# Patient Record
(demographics unavailable — no encounter records)

---

## 2018-02-02 NOTE — RAD
Exam performed: CT angiogram chest.

 

HISTORY: COPD, emphysema, shortness of breath and elevated d-dimer.

 

DATE OF SERVICE: 2/2/2018.

 

COMPARISON: None available

 

TECHNIQUE: Contiguous helical acquisitions are obtained through the chest 

during intravenous administration of 75 cc of Omnipaque 300. Sagittal and 

coronal MIP images are obtained and reviewed.

 

FINDINGS:

 

Adequate opacification of the pulmonary arteries. No filling defects to 

suggest pulmonary embolism is seen. Mild ectasia of the ascending aorta 

which measures up to 3.8 cm maximum AP dimension at the level of right 

main pulmonary artery. The central airway is patent without endoluminal 

lesions. No neck or axillary  is seen. Mildly prominent mediastinal and 

right hilar lymph nodes are identified.

 

Bilateral emphysematous changes with more severe biapical emphysematous 

changes. Patchy infiltrates seen in both lung bases and right middle lobe,

greatest in the right lung base. There are changes of mild bronchiectasis 

in both lower lobes. There is no pleural effusion or pneumothorax.

 

Limited evaluation of the upper abdominal structures is unremarkable. 

There is perhaps a nonobstructing calculus in the left kidney.

 

IMPRESSION:

 

1.  Diffuse bilateral emphysematous changes with scattered infiltrates in 

both lower lobes and right middle lobe.

2.  No evidence of pulmonary embolism seen.

3.  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 

cm at the level of right main pulmonary artery.

4.  Nonobstructing left renal calculus.

 

 

 

 

PQRS Compliance Statement:

 

One or more of the following individualized dose reduction techniques were

utilized for this examination:  

1. Automated exposure control  

2. Adjustment of the mA and/or kV according to patient size  

3. Use of iterative reconstruction technique

 

 

Electronically signed by: Mayte Villarreal MD (2/2/2018 10:19 PM) [facility]

## 2018-02-02 NOTE — ED.ADGEN
Past History


Past Medical History:  CAD, CHF, COPD


Past Surgical History:  Other


Smoking:  Cigarettes





Adult General


Chief Complaint


Chief Complaint


".. [facility].... [facility] ....[facility].... [facility] 

my home.... oxygen set up.. and my tank ....ran out... "





[facility]


HPI





Patient is a 64 year old male who presents with respiratory failure.  Pt. 

presents with hypoxia after [facility]. 

Paramedic advised on arrival  in severe respiratory distress .  The pt. sats  

were too low to read and even after treatments his sats. were only 70%,  Pt. hx 

95 pack year smoking hx, has been so short of breath today he could not smoke.  

Pt. portable tank ran out today.  Pt. hx of 3 liter  oxygen dependent.   Pt.  

sent to [place] [facility].   Pt. has as yet not been intubated 

for a COPD exacerbation.  Pt. denies any travel, any specific ill contacts, 

other than other hospitalized patients.  Patient in obvious respiratory 

distress and tachycardic.  Pt. only able to speak in 2 to 3  words groups due 

to his severe dyspnea. Patient has been complaining of chills, fever, rhinorrhea

, pharyngitis, nausea,myalgia, arthralgia, and malaise.  Patient reports his 

symptoms seem worse the last couple days.





Review of Systems


Review of Systems





Constitutional: Subjective history fever or chills []


Eyes: Denies change in visual acuity, redness, or eye pain []


HENT: History nasal congestion and sore throat []


Respiratory: History cough and shortness of breath []


Cardiovascular: No additional information not addressed in HPI []


GI: Denies abdominal pain, nausea, vomiting, bloody stools or diarrhea []


: Denies dysuria or hematuria []


Musculoskeletal: Complains of generalized muscle and or joint pain []


Integument: Denies rash or skin lesions []


Neurologic: Denies headache, focal weakness or sensory changes []


Endocrine: Denies polyuria or polydipsia []





All other systems were reviewed and found to be within normal limits, except as 

documented in this note.





Family History


Family History


Noncontributory





Current Medications


Current Medications





Current Medications








 Medications


  (Trade)  Dose


 Ordered  Sig/John  Start Time


 Stop Time Status Last Admin


Dose Admin


 


 Albuterol/


 Ipratropium


  (Duoneb)  3 ml  RTQID  2/3/18 08:00


 2/4/18 07:59   


 


 


 Aspirin


  (Children'S


 Aspirin)  81 mg  DAILY  2/3/18 09:00


     


 


 


 Azithromycin


  (Zithromax)  250 mg  DAILY  2/3/18 09:00


     


 


 


 Ceftriaxone


 Sodium 1 gm/


 Sodium Chloride  50 ml @ 


 100 mls/hr  DAILY  2/3/18 09:00


   UNV  


 


 


 Ceftriaxone Sodium


  (Rocephin)  1 gm  1X  ONCE  2/2/18 19:45


 2/2/18 19:46 DC 2/2/18 19:42


1 GM


 


 Enoxaparin Sodium


  (Lovenox 80mg


 Syringe)  70 mg  Q12HR  2/3/18 09:00


     


 


 


 Enoxaparin Sodium


  (Lovenox)  75 mg  BID  2/2/18 21:00


 2/2/18 21:09 DC  


 


 


 Info


  (Do NOT chart on


 this entry -- for


 MONITORING)  1 each  PRN DAILY  PRN  2/2/18 20:45


 2/4/18 20:44   


 


 


 Iohexol


  (Omnipaque 300


 Mg/ml)  75 ml  1X  ONCE  2/2/18 21:00


 2/2/18 21:01 DC 2/2/18 21:19


75 ML


 


 Lactobacillus


 Rhamnosus


  (Culturelle)  1 cap  BID  2/3/18 09:00


     


 


 


 Methylprednisolone


 Sodium Succinate


  (SOLU-Medrol


 125MG VIAL)  125 mg  1X  ONCE  2/2/18 19:45


 2/2/18 19:46 DC 2/2/18 19:38


125 MG


 


 Morphine Sulfate


  (Morphine 10mg


 Syringe)  10 mg  PRN QID  PRN  2/2/18 21:00


     


 


 


 Ondansetron HCl


  (Zofran)  4 mg  PRN Q4HRS  PRN  2/2/18 20:30


 2/3/18 20:29   


 


 


 Oseltamivir


 Phosphate


  (Tamiflu)  75 mg  BID  2/2/18 21:00


 2/7/18 20:59   


 


 


 Sodium Chloride  1,000 ml @ 


 100 mls/hr  Q10H  2/2/18 19:00


 2/3/18 04:59  2/2/18 19:42


100 MLS/HR





See nursing for home meds





Allergies


Allergies





Allergies








Coded Allergies Type Severity Reaction Last Updated Verified


 


  No Known Drug Allergies    2/2/18 No





No known drug allergies





Physical Exam


Physical Exam





Constitutional: in acute distress, non-toxic appearance. []


HENT: Normocephalic, atraumatic, bilateral external ears normal, oropharynx 

moist, mild injection of pharynx, no oral exudates, nose rhinorrhea.  

Edentulous.


Eyes: PERRLA, EOMI, conjunctiva normal, no discharge. [] 


Neck: Normal range of motion, no tenderness, supple, no stridor. [] 


Cardiovascular: Tachycardia Heart rate regular rhythm, no murmur []


Lungs & Thorax:  Bilateral breath sounds equal apex with scattered wheezes 

throughout auscultation, [retractions, minimal air movement


Abdomen: Bowel sounds normal, soft, no tenderness, no masses, no pulsatile 

masses. [] 


Skin: Warm, dry, no erythema, no rash. [] 


Back: Chronic low back pain, but complains of generalized arthralgia, myalgia 

and malaise. no CVA tenderness. Old surgical scars


Extremities: No tenderness, no cyanosis, no clubbing, ROM intact, no edema. 

Muscle wasting


Neurologic: Alert and oriented X 3, normal motor function, normal sensory 

function, no focal deficits noted. []


Psychologic: Affect anxious, judgement normal, mood normal. []





Current Patient Data


Vital Signs





 Vital Signs








  Date Time  Temp Pulse Resp B/P (MAP) Pulse Ox O2 Delivery O2 Flow Rate FiO2


 


2/2/18 19:20     97 Nasal Cannula 3.0 


 


2/2/18 18:50 98.1 116 20     








Lab Results





 Laboratory Tests








Test


  2/2/18


18:50 2/2/18


19:00 2/2/18


19:20


 


White Blood Count


  10.0 x10^3/uL


(4.0-11.0) 


  


 


 


Red Blood Count


  5.34 x10^6/uL


(4.30-5.70) 


  


 


 


Hemoglobin


  17.2 g/dL


(13.0-17.5) 


  


 


 


Hematocrit


  50.2 %


(39.0-53.0) 


  


 


 


Mean Corpuscular Volume


  94 fL ()


  


  


 


 


Mean Corpuscular Hemoglobin 32 pg (25-35)    


 


Mean Corpuscular Hemoglobin


Concent 34 g/dL


(31-37) 


  


 


 


Red Cell Distribution Width


  12.8 %


(11.5-14.5) 


  


 


 


Platelet Count


  199 x10^3/uL


(140-400) 


  


 


 


Neutrophils (%) (Auto) 92 % (31-73)  H  


 


Lymphocytes (%) (Auto) 3 % (24-48)  L  


 


Monocytes (%) (Auto) 4 % (0-9)    


 


Eosinophils (%) (Auto) 0 % (0-3)    


 


Basophils (%) (Auto) 0 % (0-3)    


 


Neutrophils # (Auto)


  9.2 x10^3uL


(1.8-7.7)  H 


  


 


 


Lymphocytes # (Auto)


  0.3 x10^3/uL


(1.0-4.8)  L 


  


 


 


Monocytes # (Auto)


  0.4 x10^3/uL


(0.0-1.1) 


  


 


 


Eosinophils # (Auto)


  0.0 x10^3/uL


(0.0-0.7) 


  


 


 


Basophils # (Auto)


  0.0 x10^3/uL


(0.0-0.2) 


  


 


 


Prothrombin Time


  11.8 SEC


(9.4-11.4)  H 


  


 


 


Prothrombin Time INR


  1.2 (0.9-1.1)


H 


  


 


 


PTT


  27 SEC (23-33)


  


  


 


 


D-Dimer ([place])


  1.07 mg/L


(0.00-0.50)  H 


  


 


 


Sodium Level


  138 mmol/L


(136-145) 


  


 


 


Potassium Level


  4.9 mmol/L


(3.5-5.1) 


  


 


 


Chloride Level


  98 mmol/L


() 


  


 


 


Carbon Dioxide Level


  32 mmol/L


(21-32) 


  


 


 


Anion Gap 8 (6-14)    


 


Blood Urea Nitrogen


  25 mg/dL


(8-26) 


  


 


 


Creatinine


  0.9 mg/dL


(0.7-1.3) 


  


 


 


Estimated GFR


(Cockcroft-Gault) 85.0  


  


  


 


 


Glucose Level


  155 mg/dL


(70-99)  H 


  


 


 


Calcium Level


  8.9 mg/dL


(8.5-10.1) 


  


 


 


Magnesium Level


  2.3 mg/dL


(1.8-2.4) 


  


 


 


Total Bilirubin


  0.4 mg/dL


(0.2-1.0) 


  


 


 


Direct Bilirubin


  0.1 mg/dL


(0.0-0.2) 


  


 


 


Aspartate Amino Transferase


(AST) 39 U/L (15-37)


H 


  


 


 


Alanine Aminotransferase (ALT)


  36 U/L (16-63)


  


  


 


 


Alkaline Phosphatase


  85 U/L


() 


  


 


 


Creatine Kinase


  82 U/L


() 


  


 


 


Creatine Kinase MB (Mass)


  3.7 ng/mL


(0.0-3.6)  H 


  


 


 


Creatine Kinase MB Relative


Index 4.5 % (0-4)  H


  


  


 


 


Troponin I Quantitative


  < 0.017 ng/mL


(0-0.055) 


  


 


 


NT-Pro-B-Type Natriuretic


Peptide 288 pg/mL


(0-124)  H 


  


 


 


Total Protein


  7.5 g/dL


(6.4-8.2) 


  


 


 


Albumin


  3.3 g/dL


(3.4-5.0)  L 


  


 


 


Lipase


  67 U/L


()  L 


  


 


 


Blood pH


  


  7.35


(7.35-7.46) 


 


 


Blood Gas PCO2


  


  57 mmHg


(35-46)  H 


 


 


Blood Gas PO2


  


  100 mmHg


() 


 


 


Blood Gas HCO3


  


  31 mmol/L


(21-28)  H 


 


 


Arterial Bld O2 Saturation


(Calc) 


  97 % (92-99)  


  


 


 


FiO2  32 %   


 


Influenza Type A (Rapid)


  


  


  Negative


(NEGATIVE)


 


Influenza Type B (Rapid)


  


  


  Positive


(NEGATIVE)











EKG


EKG


My interpretation EKG shows a sinus tachycardia at 107 bpm with bi multiple P 

waves, left axis and anterior lateral strain pattern.[]





Radiology/Procedures


Radiology/Procedures


My interpretation chest x-ray shows chronic scarring, flattening of diaphragm 

and hyperexpanded with emphysema/COPD changes.[]


CT of chest pending at time of admission





Course & Med Decision Making


Course & Med Decision Making


Pertinent Labs and Imaging studies reviewed. (See chart for details)





Discussed presentation, testing and tx. plan with Dr. Quiñonez for further 

evaluation and treatment





[]





Final Impression


Final Impression


1. Respiratory Failure


2. COPD exacerbation


3. Hypoxia[]


4. Influenza B


5. Elevated d-dimer


6. Elevated glucose


7. Viral syndrome


Problems:  





Dragon Disclaimer


Dragon Disclaimer


This electronic medical record was generated, in whole or in part, using a 

voice recognition dictation system.











MARK SUÁREZ MD Feb 2, 2018 18:59

## 2018-02-02 NOTE — EKG
[facility] [address], [place] [address]

Test Date:    2018               Test Time:    18:56:16

Pat Name:     SRAVANI BROWN                Department:   

Patient ID:   [id number]           Room:          

Gender:       M                        Technician:   SRAVANI BROWN

:          1953               Requested By: MARK SUÁREZ

Order Number: [id number]            Reading MD:     

                                 Measurements

Intervals                              Axis          

Rate:         107                      P:            -21

DC:           118                      QRS:          -23

QRSD:         88                       T:            -16

QT:           320                                    

QTc:          432                                    

                           Interpretive Statements

SINUS TACHYCARDIA

LEFT ATRIAL ABNORMALITY

LEFTWARD AXIS

R-S TRANSITION ZONE IN V LEADS DISPLACED TO THE LEFT

QRS(T) CONTOUR ABNORMALITY

CONSIDER ANTEROLATERAL MYOCARDIAL DAMAGE

T ABNORMALITY IN INFERIOR LEADS

ABNORMAL ECG

RI6.01

No previous ECG available for comparison

## 2018-02-03 NOTE — RAD
Indication: Hypoxemia.  History of COPD.



Technique: Portable AP upright chest x-ray



Comparison: None



Findings:

Heart is normal in size.  Lungs are hyperinflated without focal consolidation.

 No pneumothorax or pleural effusion.  Visualized bony thorax within normal

limits.



Impression:

No acute cardiopulmonary process.  Findings of COPD.

## 2018-02-03 NOTE — HP
ADMIT DATE:  2018



HISTORY OF PRESENT ILLNESS:  The patient is a 64-year-old  male

patient, a , who came to the Emergency Room, who was discharged from a

[facility] yesterday.  He was admitted there with

COPD exacerbation and was discharged home with oxygen.  Unfortunately, his

oxygen tank was empty and the paramedic on arrival found him to be in severe

respiratory distress and his oxygen saturation was extremely low and even after

treatment, his oxygen saturation was only 70%.  The patient is a heavy smoker,

has been a smoker for almost 45 years and has a 95 pack year smoking history and

apparently has been so short of breath that he could not smoke.  His oxygen

portable tank ran out yesterday.  He has been on 3 L of oxygen continuously.  He

[facility]; however, he has never been

intubated for COPD exacerbation.  He denied any travel, any specific ill contact

other than being hospitalized patient and by the time he arrived here, he was in

obvious respiratory distress, tachypneic, was barely able to walk, to speak even

2-3 words due to severe dyspnea, has been complaining of chills, fever,

rhinorrhea, pharyngitis, nausea, myalgia, arthralgia and malaise and he reported

that his symptoms seem to be worse the last couple of days.  He was evaluated in

the Emergency Room, was found to be positive for influenza B.  His D-dimer was

high and he underwent CT angio, which basically showed no evidence of pulmonary

embolism; however, he has diffuse bilateral emphysematous changes with scattered

infiltrates in both lower lobes and right middle lobe and was admitted with COPD

exacerbation, acute hypoxic hypercapnic respiratory failure, pneumonia, and ____

influenza B.



PAST MEDICAL HISTORY:  Significant for COPD.  He has also bilateral cataract,

underwent cataract extraction in 1 eye and is scheduled to have it done to the

other eye.



PAST SURGICAL HISTORY:  Significant for multiple prostate biopsies with

questionable prostate cancer, nasal surgery, back surgery.



ALLERGIES:  The patient has no known drug allergies.



MEDICATIONS:  Unfortunately, we do not know his home medications.



FAMILY HISTORY:  He has 2 brothers, 1 killed himself, one still alive and

healthy, one sister is alive, but does not know her.  Both parents were

.



SOCIAL HISTORY:  He is , has no children.  He is a heavy smoker, smokes

2-1/2 packs a day for almost 45 years.  Does not drink alcohol or use any

recreational drugs.  He is retired from the [employer].



REVIEW OF SYSTEMS:  The patient has bilateral cataracts and status post cataract

extraction in 1 eye.  Denied any glaucoma or macular degeneration.  Denied any

earache, tinnitus or sensorineural deafness.  Denied any nosebleeds, stuffy nose

or postnasal drip.  Denied any sore throat, sore tongue, toothache, hoarseness

of voice or difficulty swallowing.  He did say that he lost about 65 pounds in

the last 4 years unintentionally.  Denied any nausea or vomiting, but did have

multiple episodes of diarrhea over the last 4 days.  Denied any hematemesis,

melena or hematochezia.  Denied any dysuria, frequency, or hematuria, did

complain of nocturia.  He states that he wakes up about up to 10 times at

nighttime to go to the bathroom.  He denied any chest pain.  Did complain of

shortness of breath, cough, which is mostly dry.  Denied any orthopnea or

paroxysmal nocturnal dyspnea.  Did complain of chills, rigors and fever.  Denied

any dizziness, lightheadedness, or vertigo.



PHYSICAL EXAMINATION:

GENERAL:  On arrival to the Emergency Room, he was somewhat cachectic.  No

pallor or jaundice.  No lymphadenopathy, no thyromegaly.  No jugular venous

distention.  No limb edema.

VITAL SIGNS:  His heart rate was 116, blood pressure was 133/81, temperature was

98.1, respiratory rate was 20, and oxygen saturation was 98% on 3 L of oxygen by

nasal cannula.

HEAD, EYES, EARS, NOSE AND THROAT:  Showed normocephalic, atraumatic.

NECK:  Supple.

HEART:  Showed normal first and second heart sounds with no gallop, rub or

murmur.

CHEST:  Clear to auscultation.  No crepitation or rhonchi.

ABDOMEN:  Distended, soft, nontender.  No guarding or rigidity.  No

organomegaly.  Hernial orifice intact.  Bowel sounds normal.

NEUROLOGIC:  He was awake, alert, responding appropriately.  Cranial nerves

intact.

EXTREMITIES:  He moves extremities without difficulty, ambulates without

assistance or assistive devices.



LABORATORY DATA:  His white cell count was 10,000, hemoglobin 17, hematocrit 50,

MCV 94 and platelet count of 199,000.  His chemistry showed a serum sodium 138,

potassium 4.9, chloride 98, bicarbonate 32, anion gap of 8, BUN 25, creatinine

0.9, estimated GFR was 85 mL per minute, his glucose 155, calcium was 8.9,

magnesium was 2.3.  Total bilirubin, AST, ALT, and alkaline phosphatase were

normal.  His total protein was 7.5, albumin was 3.3.  Lipase was 67.  TSH was

0.329.  His arterial blood gases showed a pH of 7.35, pCO2 of 57, pO2 of 100. 

His bicarbonate was 31 and oxygen saturation was 97% on FiO2 of 32%.  His

prothrombin time was 11.8, INR 1.2, APTT was 27.  D-dimer is 1.07.  Urinalysis

was essentially unremarkable.  There was large amount of blood and 11-20 rbc's,

there were 1-4 wbc's, very few bacteria.  His toxic screen was positive for

opiates and benzodiazepine.  His serology was positive for influenza B.  His

chest x-ray showed the heart is normal in size.  Lungs are hyperinflated without

local consolidation, no pneumothorax, no pleural effusion, visualized bony

thorax within normal limits.  His CT angio showed that the patient has diffuse

bilateral emphysematous changes with scattered infiltrates in both lower lobes

and right middle lobe.  No evidence of pulmonary embolism seen.  He has mild

aneurysmal dilatation of the ascending aorta measuring up to 3.8 cm at the level

of right main pulmonary artery, nonobstructing left renal calculus.



IMPRESSION:  In summary, this is a 64-year-old  male patient who was

admitted with an acute hypoxic hypercapnic respiratory failure, chronic

obstructive pulmonary disease exacerbation, influenza B, and bilateral lung

infiltrate.  He has also enlarged and benign prostatic hypertrophy versus

prostate cancer with severe nocturia.  My plan is to discontinue the Lovenox and

start him only on prophylactic dose and add steroids.  Continue with IV

antibiotic and nebulized treatment as well as Tamiflu.  We will scan his bladder

to see if he is retaining any urine as he might require catheterization.





______________________________

YOLANDA JESUS MD



DR:  CEM/malorie  JOB#:  [id number] / [id number]

DD:  2018 15:08  DT:  2018 19:43

## 2018-02-05 NOTE — PN
DATE:  



SUBJECTIVE:  The patient is resting, slightly propped up in bed, in no apparent

respiratory distress.  He is definitely much improved than yesterday.



PHYSICAL EXAMINATION:

GENERAL:  When I examined him, he looked well.  No pallor, jaundice, cyanosis,

or thyromegaly.  No jugular venous distension.  No lower limb edema.

VITAL SIGNS:  His heart rate was 66, blood pressure 114/75, temperature was

97.6, respiratory rate was 20, and oxygen saturation was 98% on 2 liters of

oxygen.

HEAD, EYES, EARS, NOSE, AND THROAT:  Normocephalic, atraumatic.

NECK:  Supple.

HEART:  Showed normal first and second heart sounds with no gallop, rub, or

murmur.

CHEST:  Showed central trachea, equally reduced expansion, reduced air entry,

vesicular sounds.  I could not really appreciate any crepitation or rhonchi.

ABDOMEN:  Distended, soft, nontender.

NEUROLOGIC:  He is awake, alert, responding appropriately.  Cranial nerves

intact.  He moves all extremities without difficulty.



His intake over the last 24 hours was 1680, output was 850.



LABORATORY DATA:  His lab work this morning showed a white cell count of 10,300,

hemoglobin 14, hematocrit 42, MCV 95 and platelet count of 178,000.  His blood

gases showed a pH of 7.37, pCO2 of 65, pO2 of 114, bicarb 37, and oxygen

saturation was 98% on FiO2 of 36%.  His blood cultures are so far negative.



ASSESSMENT:  This is a 64-year-old  male patient, who was admitted

with:

1.  Acute hypoxic hypercapnic respiratory failure.

2.  Chronic obstructive pulmonary disease exacerbation.

3.  Influenza B.

4.  Bilateral lung infiltrate, likely healthcare-associated pneumonia.

5.  Benign prostatic hypertrophy.



PLAN:  To continue with the Lovenox for DVT prophylaxis.  Continue with

steroids, bronchodilators, and antibiotics as well as Tamiflu.





______________________________

YOLANDA JESUS MD



DR:  CEM/malorie  JOB#:  [id number] / [id number]

DD:  02/04/2018 14:27  DT:  02/05/2018 08:48

## 2018-02-05 NOTE — PN
DATE:  02/03/2018



SUBJECTIVE:  The patient is a 64-year-old  who was admitted yesterday

with COPD exacerbation and acute hypoxic hypercapnic respiratory failure,

bilateral lung infiltrate and influenza B.  He also has chronic back pain.  He

continued to complain of shortness of breath and back pain.  He also has pain in

his suprapubic area and continued to have nocturia.



PHYSICAL EXAMINATION:

GENERAL:  When I saw him this afternoon, he was resting slightly propped up in

bed, slightly tachypneic, but there is no pallor, jaundice, cyanosis, or

thyromegaly.  No jugular venous distension.  No lower limb edema.

VITAL SIGNS:  His heart rate was 79, blood pressure was 137/85, temperature was

98.4, respiratory rate was 22 and oxygen saturation was 98% on 3 liters of

oxygen.

HEAD, EYES, EARS, NOSE AND THROAT:  Showed normocephalic, atraumatic.

NECK:  Supple.

HEART:  Showed normal first and second heart sounds with no gallop, rub or

murmur.

CHEST:  Showed central trachea, equally reduced expansion, reduced air entry,

vesicular sounds.  I really could not appreciate any crepitation or rhonchi.

ABDOMEN:  Scaphoid, soft, nontender.

NEUROLOGIC:  He is awake, alert, responding appropriately.  All cranial nerves

are intact.  He moves extremities without difficulty.  He ambulates without

assistance or assistive devices.



ASSESSMENT:

1.  Acute hypoxic hypercapnic respiratory failure.

2.  Chronic obstructive pulmonary disease exacerbation.

3.  Bilateral lung infiltrate.

4.  Influenza B.

5.  Chronic back pain.

6.  Benign prostatic hypertrophy versus prostate cancer.



PLAN:  My plan is to put him on Solu-Medrol 125 mg once a day and 60 mg every 8

hours.  Add Singulair and Pulmicort.  Discontinue subcutaneous morphine.  Start

him on oxycodone 5 mg every 4 hours.  We will scan his bladder to make sure that

he has no urinary retention.  Start him also on Flomax 0.4 mg. I will check and

repeat all his labs including PSA tomorrow.





______________________________

YOLANDA JESUS MD



DR:  CEM/malorie  JOB#:  [id number] / [id number]

DD:  02/03/2018 15:18  DT:  02/05/2018 00:27

## 2018-02-05 NOTE — PDOC3
Discharge Summary


Visit Information


Date of Admission:  Feb 2, 2018


Date of Discharge:  Feb 5, 2018


Final Diagnosis


Problems


Medical Problems:


(1) COPD exacerbation


Status: Acute  








1.  Acute hypoxic hypercapnic respiratory failure.


2.  Chronic obstructive pulmonary disease exacerbation.


3.  Influenza B.


4.  Bilateral lung infiltrate, likely healthcare-associated pneumonia.


5.  Benign prostatic hypertrophy.


6. Tobacco use disorder


7. 3.8 cm aortic aneurysm


8. emphesema on ct


9. mildly suppressed TSH


10. DVT prophylaxis-on Lovonox


Problems:  





Brief Hospital Course


Allergies





 Allergies








Coded Allergies Type Severity Reaction Last Updated Verified


 


  No Known Drug Allergies    2/2/18 No








Vital Signs


PE Mildly SOB, lungs with diffuse wheezes, cvrrr, ab soft non tender, ext 

without edema.


Vital Signs








  Date Time  Temp Pulse Resp B/P (MAP) Pulse Ox O2 Delivery O2 Flow Rate FiO2


 


2/5/18 15:47 97.7 75  141/79 (99) 99  2.0 


 


2/5/18 14:44   18     


 


2/5/18 10:53      Nasal Cannula  








Lab Results





Laboratory Tests








Test


  2/3/18


16:58 2/4/18


06:48 2/5/18


05:50


 


Blood Gas pH


  7.37


(7.35-7.46) 


  


 


 


Blood Gas PCO2


  65 mmHg


(35-46) 


  


 


 


Blood Gas PO2


  114 mmHg


() 


  


 


 


Blood Gas HCO3


  37 mmol/L


(21-28) 


  


 


 


Arterial Bld O2 Saturation


(Calc) 98 % (92-99) 


  


  


 


 


FiO2 36 %   


 


White Blood Count


  


  10.3 x10^3/uL


(4.0-11.0) 10.1 x10^3/uL


(4.0-11.0)


 


Red Blood Count


  


  4.51 x10^6/uL


(4.30-5.70) 4.22 x10^6/uL


(4.30-5.70)


 


Hemoglobin


  


  14.3 g/dL


(13.0-17.5) 13.5 g/dL


(13.0-17.5)


 


Hematocrit


  


  42.7 %


(39.0-53.0) 39.8 %


(39.0-53.0)


 


Mean Corpuscular Volume  95 fL ()  94 fL () 


 


Mean Corpuscular Hemoglobin  32 pg (25-35)  32 pg (25-35) 


 


Mean Corpuscular Hemoglobin


Concent 


  34 g/dL


(31-37) 34 g/dL


(31-37)


 


Red Cell Distribution Width


  


  12.9 %


(11.5-14.5) 12.7 %


(11.5-14.5)


 


Platelet Count


  


  178 x10^3/uL


(140-400) 196 x10^3/uL


(140-400)


 


Sodium Level


  


  141 mmol/L


(136-145) 141 mmol/L


(136-145)


 


Potassium Level


  


  4.1 mmol/L


(3.5-5.1) 4.3 mmol/L


(3.5-5.1)


 


Chloride Level


  


  100 mmol/L


() 102 mmol/L


()


 


Carbon Dioxide Level


  


  38 mmol/L


(21-32) 40 mmol/L


(21-32)


 


Anion Gap  3 (6-14)  -1 (6-14) 


 


Blood Urea Nitrogen


  


  14 mg/dL


(8-26) 14 mg/dL


(8-26)


 


Creatinine


  


  0.8 mg/dL


(0.7-1.3) 0.7 mg/dL


(0.7-1.3)


 


Estimated GFR


(Cockcroft-Gault) 


  97.3 


  113.5 


 


 


BUN/Creatinine Ratio  18 (6-20)  20 (6-20) 


 


Glucose Level


  


  100 mg/dL


(70-99) 109 mg/dL


(70-99)


 


Calcium Level


  


  8.7 mg/dL


(8.5-10.1) 8.1 mg/dL


(8.5-10.1)


 


Total Bilirubin


  


  0.2 mg/dL


(0.2-1.0) 0.2 mg/dL


(0.2-1.0)


 


Aspartate Amino Transf


(AST/SGOT) 


  19 U/L (15-37) 


  17 U/L (15-37) 


 


 


Alanine Aminotransferase


(ALT/SGPT) 


  27 U/L (16-63) 


  30 U/L (16-63) 


 


 


Alkaline Phosphatase


  


  64 U/L


() 54 U/L


()


 


Total Protein


  


  6.7 g/dL


(6.4-8.2) 6.1 g/dL


(6.4-8.2)


 


Albumin


  


  2.7 g/dL


(3.4-5.0) 2.4 g/dL


(3.4-5.0)


 


Albumin/Globulin Ratio  0.7 (1.0-1.7)  0.6 (1.0-1.7) 


 


Prostate Specific Antigen


  


  2.55 ng/ml


(0.00-4.00) 


 


 


Neutrophils (%) (Auto)   88 % (31-73) 


 


Lymphocytes (%) (Auto)   5 % (24-48) 


 


Monocytes (%) (Auto)   7 % (0-9) 


 


Eosinophils (%) (Auto)   0 % (0-3) 


 


Basophils (%) (Auto)   0 % (0-3) 


 


Neutrophils # (Auto)


  


  


  8.9 x10^3uL


(1.8-7.7)


 


Lymphocytes # (Auto)


  


  


  0.5 x10^3/uL


(1.0-4.8)


 


Monocytes # (Auto)


  


  


  0.7 x10^3/uL


(0.0-1.1)


 


Eosinophils # (Auto)


  


  


  0.0 x10^3/uL


(0.0-0.7)


 


Basophils # (Auto)


  


  


  0.0 x10^3/uL


(0.0-0.2)








Brief Hospital Course


Mr. Ayala  is a 64 old [sex] who presented with [ ]








HISTORY OF PRESENT ILLNESS:  The patient is a 64-year-old  male


patient, a , who came to the Emergency Room, who was discharged from a


[facility] yesterday.  He was admitted there with


COPD exacerbation and was discharged home with oxygen.  Unfortunately, his


oxygen tank was empty and the paramedic on arrival found him to be in severe


respiratory distress and his oxygen saturation was extremely low and even after


treatment, his oxygen saturation was only 70%.  The patient is a heavy smoker,


has been a smoker for almost 45 years and has a 95 pack year smoking history and


apparently has been so short of breath that he could not smoke.  His oxygen


portable tank ran out yesterday.  He has been on 3 L of oxygen continuously.  He


[facility]; however, he has never been


intubated for COPD exacerbation.  He denied any travel, any specific ill contact


other than being hospitalized patient and by the time he arrived here, he was in


obvious respiratory distress, tachypneic, was barely able to walk, to speak even


2-3 words due to severe dyspnea, has been complaining of chills, fever,


rhinorrhea, pharyngitis, nausea, myalgia, arthralgia and malaise and he reported


that his symptoms seem to be worse the last couple of days.  He was evaluated in


the Emergency Room, was found to be positive for influenza B.  His D-dimer was


high and he underwent CT angio, which basically showed no evidence of pulmonary


embolism; however, he has diffuse bilateral emphysematous changes with scattered


infiltrates in both lower lobes and right middle lobe and was admitted with COPD


exacerbation, acute hypoxic hypercapnic respiratory failure, pneumonia, and ____


influenza B. last pm he had an episode of VT so consult was called.  He is 

currently dyspneic at rest with conversing.  He denies chest pain.  He does 

report orthopnea and says he has not slept more than a few hours in quite some 

time due to dyspnea.  He reports occasional palpitations and a remote history 

of recurrent syncope.  He denies edema.  He was noted to have wide complex 

tachycardia last pm, onset after up to bedside commode. Arrhythmia lasted >2 

minutes and was associated with increased dyspnea and chest discomfort.He has 

continued to be sob and requiring oxygen.  It is felt to be in his best 

interest to be transferred to [facility] for higher lever of care and more cardiac 

investigation.





Discharge Information


Condition at Discharge:  Stable


Disposition/Orders:  D/C to Another Facility


Dischare Medications





Current Medications


Aspirin (Children'S Aspirin) 324 mg 1X  ONCE PO  Last administered on 2/2/18at 

19:37;  Start 2/2/18 at 19:45;  Stop 2/2/18 at 19:46;  Status DC


Sodium Chloride 1,000 ml @  100 mls/hr Q10H IV  Last administered on 2/2/18at 19

:42;  Start 2/2/18 at 19:00;  Stop 2/3/18 at 04:59;  Status DC


Methylprednisolone Sodium Succinate (SOLU-Medrol 125MG VIAL) 125 mg 1X  ONCE IV

  Last administered on 2/2/18at 19:38;  Start 2/2/18 at 19:45;  Stop 2/2/18 at 

19:46;  Status DC


Ceftriaxone Sodium 1 gm/ Sodium Chloride 50 ml @  100 mls/hr 1X  ONCE IV ;  

Start 2/2/18 at 19:00;  Stop 2/2/18 at 19:29;  Status UNV


Azithromycin (Zithromax) 500 mg 1X  ONCE PO  Last administered on 2/2/18at 19:44

;  Start 2/2/18 at 19:45;  Stop 2/3/18 at 15:19;  Status DC


Albuterol/ Ipratropium (Duoneb) 3 ml 1X  ONCE NEB  Last administered on 2/2/ 18at 19:19;  Start 2/2/18 at 19:00;  Stop 2/2/18 at 19:33;  Status DC


Morphine Sulfate (Morphine 10mg Syringe) 10 mg 1X  ONCE SQ  Last administered 

on 2/2/18at 19:35;  Start 2/2/18 at 19:45;  Stop 2/3/18 at 15:09;  Status DC


Ceftriaxone Sodium (Rocephin) 1 gm STK-MED ONCE IV ;  Start 2/2/18 at 19:29;  

Stop 2/2/18 at 19:30;  Status DC


Ceftriaxone Sodium (Rocephin) 1 gm 1X  ONCE IVP  Last administered on 2/2/18at 

19:42;  Start 2/2/18 at 19:45;  Stop 2/2/18 at 19:46;  Status DC


Oseltamivir Phosphate (Tamiflu) 75 mg 1X  ONCE PO  Last administered on 2/2/ 18at 20:22;  Start 2/2/18 at 20:30;  Stop 2/2/18 at 20:31;  Status DC


Enoxaparin Sodium (Lovenox 80mg Syringe) 70 mg 1X  ONCE SQ  Last administered 

on 2/2/18at 20:24;  Start 2/2/18 at 20:30;  Stop 2/2/18 at 20:31;  Status DC


Enoxaparin Sodium (Lovenox 80mg Syringe) 80 mg STK-MED ONCE SQ ;  Start 2/2/18 

at 20:18;  Stop 2/2/18 at 20:19;  Status DC


Ondansetron HCl (Zofran) 4 mg PRN Q4HRS  PRN IV NAUSEA/VOMITING;  Start 2/2/18 

at 20:30;  Stop 2/3/18 at 20:29;  Status DC


Albuterol/ Ipratropium (Duoneb) 3 ml RTQID NEB  Last administered on 2/3/18at 20

:48;  Start 2/3/18 at 08:00;  Stop 2/4/18 at 07:59;  Status DC


Enoxaparin Sodium (Lovenox) 75 mg BID SQ ;  Start 2/2/18 at 21:00;  Stop 2/2/18 

at 21:09;  Status DC


Aspirin (Children'S Aspirin) 81 mg DAILY PO  Last administered on 2/5/18at 08:24

;  Start 2/3/18 at 09:00


Oseltamivir Phosphate (Tamiflu) 75 mg BID PO  Last administered on 2/5/18at 08:

25;  Start 2/2/18 at 21:00;  Stop 2/7/18 at 20:59


Azithromycin (Zithromax) 250 mg DAILY PO  Last administered on 2/5/18at 08:25;  

Start 2/3/18 at 09:00


Ceftriaxone Sodium 1 gm/ Sodium Chloride 50 ml @  100 mls/hr DAILY IV ;  Start 2

/3/18 at 09:00;  Stop 2/3/18 at 09:00;  Status DC


Iohexol (Omnipaque 300 Mg/ml) 75 ml 1X  ONCE IV  Last administered on 2/2/18at 

21:19;  Start 2/2/18 at 21:00;  Stop 2/2/18 at 21:01;  Status DC


Info (Do NOT chart on this entry -- for MONITORING) 1 each PRN DAILY  PRN MC 

SEE COMMENTS;  Start 2/2/18 at 20:45;  Stop 2/4/18 at 20:44;  Status DC


Morphine Sulfate (Morphine 10mg Syringe) 10 mg PRN QID  PRN SQ PAIN;  Start 2/2/ 18 at 21:00;  Stop 2/3/18 at 15:09;  Status DC


Enoxaparin Sodium (Lovenox 80mg Syringe) 70 mg Q12HR SQ  Last administered on 2/

3/18at 09:21;  Start 2/3/18 at 09:00;  Stop 2/3/18 at 14:51;  Status DC


Lactobacillus Rhamnosus (Culturelle) 1 cap BID PO  Last administered on 2/5/ 18at 08:24;  Start 2/3/18 at 09:00


Ceftriaxone Sodium 1 gm/ Sodium Chloride 50 ml @  100 mls/hr DAILY IV ;  Start 2

/3/18 at 09:00;  Status UNV


Ceftriaxone Sodium (Rocephin) 1 gm Q24H IVP ;  Start 2/3/18 at 20:00;  Stop 2/3/

18 at 20:00;  Status DC


Alprazolam (Xanax) 0.25 mg PRN Q4HRS  PRN PO ANXIETY / AGITATION Last 

administered on 2/5/18at 14:44;  Start 2/3/18 at 04:15


Enoxaparin Sodium (Lovenox) 40 mg DAILY SQ  Last administered on 2/5/18at 08:25

;  Start 2/4/18 at 09:00


Methylprednisolone Sodium Succinate (SOLU-Medrol 125MG VIAL) 125 mg 1X  ONCE IV

  Last administered on 2/3/18at 15:32;  Start 2/3/18 at 15:00;  Stop 2/3/18 at 

15:02;  Status DC


Methylprednisolone Sodium Succinate (SOLU-Medrol 40MG VIAL) 60 mg Q8HRS IV  

Last administered on 2/5/18at 14:53;  Start 2/3/18 at 22:00


Albuterol Sulfate (Ventolin) 2.5 mg PRN Q2HR  PRN NEB SHORTNESS OF BREATH;  

Start 2/3/18 at 15:00


Tamsulosin HCl (Flomax) 0.4 mg QHS PO  Last administered on 2/4/18at 20:21;  

Start 2/3/18 at 21:00


Montelukast Sodium (Singulair) 10 mg QHS PO  Last administered on 2/4/18at 20:21

;  Start 2/3/18 at 21:00


Budesonide (Pulmicort) 0.5 mg RTBID NEB  Last administered on 2/5/18at 09:31;  

Start 2/3/18 at 20:00


Oxycodone HCl (Roxicodone) 5 mg PRN Q4HRS  PRN PO PAIN Last administered on 2/5/ 18at 14:44;  Start 2/3/18 at 15:00


Piperacillin Sod/ Tazobactam Sod 3.375 gm/Sodium Chloride 50 ml @  100 mls/hr 

Q8H IV  Last administered on 2/5/18at 15:48;  Start 2/3/18 at 15:30


Levofloxacin/ Dextrose 100 ml @  100 mls/hr Q24H IV  Last administered on 2/3/

18at 16:13;  Start 2/3/18 at 16:00;  Stop 2/4/18 at 12:06;  Status DC


Albuterol/ Ipratropium (Duoneb) 3 ml RTQID NEB  Last administered on 2/5/18at 09

:31;  Start 2/4/18 at 08:00


Levofloxacin (Levaquin) 500 mg Q24H PO  Last administered on 2/4/18at 17:07;  

Start 2/4/18 at 16:00


Sodium Chloride 150 ml @  As Directed STK-MED ONCE .ROUTE  Last administered on 

2/5/18at 08:41;  Start 2/5/18 at 08:39;  Stop 2/5/18 at 08:40;  Status DC


Amiodarone HCl 900 mg/Dextrose 518 ml @ 0 mls/hr CONT  PRN IV SEE I/O RECORD 

Last administered on 2/5/18at 11:20;  Start 2/5/18 at 10:30;  Stop 2/5/18 at 11:

20;  Status DC


Amiodarone HCl 150 mg/Dextrose 103 ml @  618 mls/hr 1X  ONCE IVP  Last 

administered on 2/5/18at 10:55;  Start 2/5/18 at 10:15;  Stop 2/5/18 at 10:24;  

Status DC


Nicotine (Nicoderm Cq 21mg) 1 patch DAILY TD ;  Start 2/5/18 at 10:15;  Status 

UNV





Active Scripts


Active


NICODERM CQ 21mg (Nicotine) 1 Each Patch.td24 1 Patch TP DAILY


Amiodarone 900 mg/500 ml-D5w (Amiodarone HCl/D5w) 900 Mg/500 Ml Plast..bag 900 

Mg IV DAILY 1 Days


Flomax (Tamsulosin Hcl) 0.4 Mg Cap.er.24h 0.4 Mg PO QHS 7 Days


Oxycodone Hcl 5 Mg Tablet 5 Mg PO PRN Q4HRS PRN 7 Days


Tamiflu (Oseltamivir Phosphate) 75 Mg Capsule 75 Mg PO BID 5 Days


Montelukast Sodium Tablet (Montelukast Sodium) 10 Mg Tablet 10 Mg PO QHS 7 Days


Solu-Medrol 40 Mg Vial (Methylprednisolone Sod Succ/Pf) 40 Mg/1 Ml Vial 60 Mg 

IV Q8HRS 7 Days


Duoneb 0.5-3(2.5) Mg/3 Ml (Albuterol/Ipratropium) 3 Ml Ampul.neb 3 Ml NEB RTQID 

7 Days


Culturelle (Lactobacillus Rhamnosus Gg) 1 Each Cap.sprink 1 Cap PO BID 7 Days


Enoxaparin Sodium 40 Mg/0.4 Ml Disp.syrin 40 Mg SQ DAILY 7 Days


Aspirin 81 Mg Tab.chew 81 Mg PO DAILY 7 Days


Alprazolam 0.25 Mg Tablet 0.25 Mg PO PRN Q4HRS PRN 7 Days


Albuterol Sulfate Neb Soln  ** (Albuterol Sulfate) 2.5 Mg/3 Ml Vial.neb 2.5 Mg 

NEB PRN Q2HR PRN 7 Days





Patient Instructions


Patient Instuctions


TRANSFER TO [facility] FOR HIGHER LEVER OF CARE AND FURTHER CARDIAC INVESTIGATION AND 

PULMONARY CONSULT.











BRIT SIDDIQUI DO Feb 5, 2018 16:08

## 2018-02-05 NOTE — PDOC2
CONSULT


Date of Admission


DATE: 2/5/18 


TIME: 09:02


Reason for Consult:


NSVT


Problem List


Problems


Medical Problems:


(1) COPD exacerbation


Status: Acute  








History of Present Illness


Mr Ayala is a 64 year old male who presented to the ED with complaints of 

dyspnea.  He reports that he has had increasing dyspnea since October to the 

point that 1 week ago Sunday he was dyspneic at rest.  [facility].  He went back the next day with 

continued symptoms and was readmitted.  He was discharged Friday with oxygen 

but apparently the tank was empty so he once again called EMS, was diverted 

here to [facility] and admitted.  He was found to have pneumonia and influenza B and 

last pm he had an episode of VT so consult was called.  He is currently 

dyspneic at rest with conversing.  He denies chest pain.  He does report 

orthopnea and says he has not slept more than a few hours in quite some time 

due to dyspnea.  He reports occasional palpitations and a remote history of 

recurrent syncope.  He denies edema.  He was noted to have wide complex 

tachycardia last pm, onset after up to bedside commode. Arrhythmia lasted >2 

minutes and was associated with increased dyspnea and chest discomfort.


Past Medical History


COPD, bilateral cataract, enlarged prostate


Past Surgical History


cataract extraction in 1 eye and is scheduled for the other, multiple prostate 

biopsies, nasal surgery, back surgery


Family History


No history of premature coronary disease


Social History


SOCIAL HISTORY:  He is , has no children.  He is a heavy smoker, smoked 

2-1/2 packs a day for almost 45 years, no smoking for 1 week.  Does not drink 

alcohol or use any recreational drugs.  He is retired from the [employer].


Current Medications





Current Medications


Aspirin (Children'S Aspirin) 324 mg 1X  ONCE PO  Last administered on 2/2/18at 

19:37;  Start 2/2/18 at 19:45;  Stop 2/2/18 at 19:46;  Status DC


Sodium Chloride 1,000 ml @  100 mls/hr Q10H IV  Last administered on 2/2/18at 19

:42;  Start 2/2/18 at 19:00;  Stop 2/3/18 at 04:59;  Status DC


Methylprednisolone Sodium Succinate (SOLU-Medrol 125MG VIAL) 125 mg 1X  ONCE IV

  Last administered on 2/2/18at 19:38;  Start 2/2/18 at 19:45;  Stop 2/2/18 at 

19:46;  Status DC


Ceftriaxone Sodium 1 gm/ Sodium Chloride 50 ml @  100 mls/hr 1X  ONCE IV ;  

Start 2/2/18 at 19:00;  Stop 2/2/18 at 19:29;  Status UNV


Azithromycin (Zithromax) 500 mg 1X  ONCE PO  Last administered on 2/2/18at 19:44

;  Start 2/2/18 at 19:45;  Stop 2/3/18 at 15:19;  Status DC


Albuterol/ Ipratropium (Duoneb) 3 ml 1X  ONCE NEB  Last administered on 2/2/ 18at 19:19;  Start 2/2/18 at 19:00;  Stop 2/2/18 at 19:33;  Status DC


Morphine Sulfate (Morphine 10mg Syringe) 10 mg 1X  ONCE SQ  Last administered 

on 2/2/18at 19:35;  Start 2/2/18 at 19:45;  Stop 2/3/18 at 15:09;  Status DC


Ceftriaxone Sodium (Rocephin) 1 gm STK-MED ONCE IV ;  Start 2/2/18 at 19:29;  

Stop 2/2/18 at 19:30;  Status DC


Ceftriaxone Sodium (Rocephin) 1 gm 1X  ONCE IVP  Last administered on 2/2/18at 

19:42;  Start 2/2/18 at 19:45;  Stop 2/2/18 at 19:46;  Status DC


Oseltamivir Phosphate (Tamiflu) 75 mg 1X  ONCE PO  Last administered on 2/2/ 18at 20:22;  Start 2/2/18 at 20:30;  Stop 2/2/18 at 20:31;  Status DC


Enoxaparin Sodium (Lovenox 80mg Syringe) 70 mg 1X  ONCE SQ  Last administered 

on 2/2/18at 20:24;  Start 2/2/18 at 20:30;  Stop 2/2/18 at 20:31;  Status DC


Enoxaparin Sodium (Lovenox 80mg Syringe) 80 mg STK-MED ONCE SQ ;  Start 2/2/18 

at 20:18;  Stop 2/2/18 at 20:19;  Status DC


Ondansetron HCl (Zofran) 4 mg PRN Q4HRS  PRN IV NAUSEA/VOMITING;  Start 2/2/18 

at 20:30;  Stop 2/3/18 at 20:29;  Status DC


Albuterol/ Ipratropium (Duoneb) 3 ml RTQID NEB  Last administered on 2/3/18at 20

:48;  Start 2/3/18 at 08:00;  Stop 2/4/18 at 07:59;  Status DC


Enoxaparin Sodium (Lovenox) 75 mg BID SQ ;  Start 2/2/18 at 21:00;  Stop 2/2/18 

at 21:09;  Status DC


Aspirin (Children'S Aspirin) 81 mg DAILY PO  Last administered on 2/5/18at 08:24

;  Start 2/3/18 at 09:00


Oseltamivir Phosphate (Tamiflu) 75 mg BID PO  Last administered on 2/5/18at 08:

25;  Start 2/2/18 at 21:00;  Stop 2/7/18 at 20:59


Azithromycin (Zithromax) 250 mg DAILY PO  Last administered on 2/5/18at 08:25;  

Start 2/3/18 at 09:00


Ceftriaxone Sodium 1 gm/ Sodium Chloride 50 ml @  100 mls/hr DAILY IV ;  Start 2

/3/18 at 09:00;  Stop 2/3/18 at 09:00;  Status DC


Iohexol (Omnipaque 300 Mg/ml) 75 ml 1X  ONCE IV  Last administered on 2/2/18at 

21:19;  Start 2/2/18 at 21:00;  Stop 2/2/18 at 21:01;  Status DC


Info (Do NOT chart on this entry -- for MONITORING) 1 each PRN DAILY  PRN MC 

SEE COMMENTS;  Start 2/2/18 at 20:45;  Stop 2/4/18 at 20:44;  Status DC


Morphine Sulfate (Morphine 10mg Syringe) 10 mg PRN QID  PRN SQ PAIN;  Start 2/2/ 18 at 21:00;  Stop 2/3/18 at 15:09;  Status DC


Enoxaparin Sodium (Lovenox 80mg Syringe) 70 mg Q12HR SQ  Last administered on 2/

3/18at 09:21;  Start 2/3/18 at 09:00;  Stop 2/3/18 at 14:51;  Status DC


Lactobacillus Rhamnosus (Culturelle) 1 cap BID PO  Last administered on 2/5/ 18at 08:24;  Start 2/3/18 at 09:00


Ceftriaxone Sodium 1 gm/ Sodium Chloride 50 ml @  100 mls/hr DAILY IV ;  Start 2

/3/18 at 09:00;  Status UNV


Ceftriaxone Sodium (Rocephin) 1 gm Q24H IVP ;  Start 2/3/18 at 20:00;  Stop 2/3/

18 at 20:00;  Status DC


Alprazolam (Xanax) 0.25 mg PRN Q4HRS  PRN PO ANXIETY / AGITATION Last 

administered on 2/5/18at 04:23;  Start 2/3/18 at 04:15


Enoxaparin Sodium (Lovenox) 40 mg DAILY SQ  Last administered on 2/5/18at 08:25

;  Start 2/4/18 at 09:00


Methylprednisolone Sodium Succinate (SOLU-Medrol 125MG VIAL) 125 mg 1X  ONCE IV

  Last administered on 2/3/18at 15:32;  Start 2/3/18 at 15:00;  Stop 2/3/18 at 

15:02;  Status DC


Methylprednisolone Sodium Succinate (SOLU-Medrol 40MG VIAL) 60 mg Q8HRS IV  

Last administered on 2/5/18at 06:03;  Start 2/3/18 at 22:00


Albuterol Sulfate (Ventolin) 2.5 mg PRN Q2HR  PRN NEB SHORTNESS OF BREATH;  

Start 2/3/18 at 15:00


Tamsulosin HCl (Flomax) 0.4 mg QHS PO  Last administered on 2/4/18at 20:21;  

Start 2/3/18 at 21:00


Montelukast Sodium (Singulair) 10 mg QHS PO  Last administered on 2/4/18at 20:21

;  Start 2/3/18 at 21:00


Budesonide (Pulmicort) 0.5 mg RTBID NEB  Last administered on 2/4/18at 20:12;  

Start 2/3/18 at 20:00


Oxycodone HCl (Roxicodone) 5 mg PRN Q4HRS  PRN PO PAIN Last administered on 2/5/ 18at 04:23;  Start 2/3/18 at 15:00


Piperacillin Sod/ Tazobactam Sod 3.375 gm/Sodium Chloride 50 ml @  100 mls/hr 

Q8H IV  Last administered on 2/5/18at 08:24;  Start 2/3/18 at 15:30


Levofloxacin/ Dextrose 100 ml @  100 mls/hr Q24H IV  Last administered on 2/3/

18at 16:13;  Start 2/3/18 at 16:00;  Stop 2/4/18 at 12:06;  Status DC


Albuterol/ Ipratropium (Duoneb) 3 ml RTQID NEB  Last administered on 2/4/18at 20

:12;  Start 2/4/18 at 08:00


Levofloxacin (Levaquin) 500 mg Q24H PO  Last administered on 2/4/18at 17:07;  

Start 2/4/18 at 16:00


Sodium Chloride 150 ml @  As Directed STK-MED ONCE .ROUTE  Last administered on 

2/5/18at 08:41;  Start 2/5/18 at 08:39;  Stop 2/5/18 at 08:40;  Status DC





Active Scripts


Active


Reported


Oxycodone Hcl 10 Mg Tablet 10 Mg PO 


Gabapentin 300 Mg Capsule 300 Mg PO TID


Allergies:  


Coded Allergies:  


     No Known Drug Allergies (Unverified , 2/2/18)


Review of System


as per HPI


General:  Alert, Oriented X3, Cooperative, moderate distress


HEENT:  Atraumatic, EOMI


Lungs:  Other (decreased with occasional exp wheeze)


Heart:  Other (irregular rate and rhythm, no gallops)


Abdomen:  Normal bowel sounds, Soft


Extremities:  No cyanosis, Normal pulses, Other (trace edema)


Neuro:  Strength at 5/5 X4 ext


Psych/Mental Status:  Mental status NL, Mood NL


VITALS





Vital Signs








  Date Time  Temp Pulse Resp B/P (MAP) Pulse Ox O2 Delivery O2 Flow Rate FiO2


 


2/5/18 05:56 97.5 72 18 95/71 (79) 95 Nasal Cannula 2.0 








Labs





Laboratory Tests








Test


  2/3/18


16:58 2/4/18


06:48 2/5/18


05:50


 


Blood Gas pH


  7.37


(7.35-7.46) 


  


 


 


Blood Gas PCO2


  65 mmHg


(35-46) 


  


 


 


Blood Gas PO2


  114 mmHg


() 


  


 


 


Blood Gas HCO3


  37 mmol/L


(21-28) 


  


 


 


Arterial Bld O2 Saturation


(Calc) 98 % (92-99) 


  


  


 


 


FiO2 36 %   


 


White Blood Count


  


  10.3 x10^3/uL


(4.0-11.0) 10.1 x10^3/uL


(4.0-11.0)


 


Red Blood Count


  


  4.51 x10^6/uL


(4.30-5.70) 4.22 x10^6/uL


(4.30-5.70)


 


Hemoglobin


  


  14.3 g/dL


(13.0-17.5) 13.5 g/dL


(13.0-17.5)


 


Hematocrit


  


  42.7 %


(39.0-53.0) 39.8 %


(39.0-53.0)


 


Mean Corpuscular Volume  95 fL ()  94 fL () 


 


Mean Corpuscular Hemoglobin  32 pg (25-35)  32 pg (25-35) 


 


Mean Corpuscular Hemoglobin


Concent 


  34 g/dL


(31-37) 34 g/dL


(31-37)


 


Red Cell Distribution Width


  


  12.9 %


(11.5-14.5) 12.7 %


(11.5-14.5)


 


Platelet Count


  


  178 x10^3/uL


(140-400) 196 x10^3/uL


(140-400)


 


Sodium Level


  


  141 mmol/L


(136-145) 141 mmol/L


(136-145)


 


Potassium Level


  


  4.1 mmol/L


(3.5-5.1) 4.3 mmol/L


(3.5-5.1)


 


Chloride Level


  


  100 mmol/L


() 102 mmol/L


()


 


Carbon Dioxide Level


  


  38 mmol/L


(21-32) 40 mmol/L


(21-32)


 


Anion Gap  3 (6-14)  -1 (6-14) 


 


Blood Urea Nitrogen


  


  14 mg/dL


(8-26) 14 mg/dL


(8-26)


 


Creatinine


  


  0.8 mg/dL


(0.7-1.3) 0.7 mg/dL


(0.7-1.3)


 


Estimated GFR


(Cockcroft-Gault) 


  97.3 


  113.5 


 


 


BUN/Creatinine Ratio  18 (6-20)  20 (6-20) 


 


Glucose Level


  


  100 mg/dL


(70-99) 109 mg/dL


(70-99)


 


Calcium Level


  


  8.7 mg/dL


(8.5-10.1) 8.1 mg/dL


(8.5-10.1)


 


Total Bilirubin


  


  0.2 mg/dL


(0.2-1.0) 0.2 mg/dL


(0.2-1.0)


 


Aspartate Amino Transf


(AST/SGOT) 


  19 U/L (15-37) 


  17 U/L (15-37) 


 


 


Alanine Aminotransferase


(ALT/SGPT) 


  27 U/L (16-63) 


  30 U/L (16-63) 


 


 


Alkaline Phosphatase


  


  64 U/L


() 54 U/L


()


 


Total Protein


  


  6.7 g/dL


(6.4-8.2) 6.1 g/dL


(6.4-8.2)


 


Albumin


  


  2.7 g/dL


(3.4-5.0) 2.4 g/dL


(3.4-5.0)


 


Albumin/Globulin Ratio  0.7 (1.0-1.7)  0.6 (1.0-1.7) 


 


Neutrophils (%) (Auto)   88 % (31-73) 


 


Lymphocytes (%) (Auto)   5 % (24-48) 


 


Monocytes (%) (Auto)   7 % (0-9) 


 


Eosinophils (%) (Auto)   0 % (0-3) 


 


Basophils (%) (Auto)   0 % (0-3) 


 


Neutrophils # (Auto)


  


  


  8.9 x10^3uL


(1.8-7.7)


 


Lymphocytes # (Auto)


  


  


  0.5 x10^3/uL


(1.0-4.8)


 


Monocytes # (Auto)


  


  


  0.7 x10^3/uL


(0.0-1.1)


 


Eosinophils # (Auto)


  


  


  0.0 x10^3/uL


(0.0-0.7)


 


Basophils # (Auto)


  


  


  0.0 x10^3/uL


(0.0-0.2)








Images


EKG - sinus tachycardia, left axis, non specific abnormalities





CXR -


Impression:


No acute cardiopulmonary process.  Findings of COPD.





CT - 


IMPRESSION:


1.  Diffuse bilateral emphysematous changes with scattered infiltrates in 


both lower lobes and right middle lobe.


2.  No evidence of pulmonary embolism seen.


3.  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 


cm at the level of right main pulmonary artery.


4.  Nonobstructing left renal calculus.


Assessment/Plan


1.  Ventricular tachycardia


2.  acute on chronic hypercapnic, hypoxic respiratory failure - multifactorial.

  +influenza B, pneumonia and emphysema.  - mgmt per PCP


3.  atrial fibrillation iwth RVR, new onset 


  > 2 minutes of WCT suspicious for ventricular tachycardia as well as new 

onset atrial fibrillation with RVR.  Start amiodarone drip and suggest transfer 

to [facility] for further cardiac workup as well as pulmonary consult.


Problems:  











MASSIMO QUINTERO Feb 5, 2018 09:14

## 2018-02-19 NOTE — EKG
[facility] [address], [place] [address]

Test Date:    2018               Test Time:    16:08:56

Pat Name:     SRAVANI BROWN                Department:   

Patient ID:   [id number]           Room:          

Gender:       M                        Technician:   HUE

:          1953               Requested By: VIRIDIANA ROCA

Order Number: [id number]            Reading MD:   Aryan Zhong

                                 Measurements

Intervals                              Axis          

Rate:         82                       P:            61

OH:           144                      QRS:          90

QRSD:         88                       T:            74

QT:           342                                    

QTc:          402                                    

                           Interpretive Statements

SINUS RHYTHM

R-S TRANSITION ZONE IN V LEADS DISPLACED TO THE LEFT

NONSPECIFIC ST-T WAVE CHANGES.

RI6.01



Electronically Signed On 2018 11:48:11 CST by Aryan Zhong

## 2018-02-19 NOTE — PHYS DOC
Past History


Past Medical History:  CAD, CHF, COPD


Past Surgical History:  Other


Smoking:  Cigarettes


Alcohol Use:  None


Drug Use:  None





Adult General


Chief Complaint


Chief Complaint:  SHORTNESS OF BREATH





HPI


HPI





Patient is a 64 year old male who presents with shortness of breath.  Patient 

reports at least one week history of cough productive of yellow sputum, wheezing

, shortness of breath at rest and with exertion, chest tightness with coughing. 

Denies fevers or chills, sore throat, nasal congestion, vomiting or diarrhea. 

History of COPD just recently placed on home oxygen which he uses 

intermittently on an as-needed basis. He does continue to smoke. Denies cardiac 

history. He was admitted here in early February and also since that time 

admitted at the [facility]. States he was supposed to follow-up with a 

cardiologist but has not yet had an appointment.





Review of Systems


Review of Systems


Constitutional: Denies fever or chills 


Eyes: Denies change in visual acuity


HENT: Denies nasal congestion or sore throat 


Respiratory: Reports cough and shortness of breath 


Cardiovascular:  Denies chest pain or edema


GI: Denies abdominal pain, nausea, vomiting, or diarrhea


: Denies dysuria or hematuria 


Musculoskeletal: Denies back pain or joint pain 


Integument: Denies rash or skin lesions 


Neurologic: Denies headache, focal weakness or sensory changes 





All other systems were reviewed and found to be within normal limits, except as 

documented in this note.





Current Medications


Current Medications





Current Medications








 Medications


  (Trade)  Dose


 Ordered  Sig/John  Start Time


 Stop Time Status Last Admin


Dose Admin


 


 Acetaminophen


  (Tylenol)  650 mg  PRN Q4HRS  PRN  2/19/18 16:30


 2/20/18 16:29   


 


 


 Albuterol Sulfate


  (Ventolin)  5 mg  1X  ONCE  2/19/18 15:45


 2/19/18 15:46 DC 2/19/18 16:00


5 MG


 


 Albuterol/


 Ipratropium


  (Duoneb)  3 ml  RTQID  2/19/18 20:00


 2/20/18 19:59   


 


 


 Aspirin


  (Children'S


 Aspirin)  324 mg  1X  ONCE  2/19/18 15:45


 2/19/18 15:46 DC 2/19/18 16:00


324 MG


 


 Ibuprofen


  (Motrin)  600 mg  1X  ONCE  2/19/18 15:45


 2/19/18 15:45 DC  


 


 


 Methylprednisolone


 Sodium Succinate


  (SOLU-Medrol


 125MG VIAL)  125 mg  1X  ONCE  2/19/18 15:45


 2/19/18 15:46 DC 2/19/18 16:00


125 MG


 


 Morphine Sulfate


  (Morphine 2mg


 Syringe)  2 mg  PRN Q2HR  PRN  2/19/18 16:30


 2/20/18 16:29   


 


 


 Nitroglycerin


  (Nitrostat)  0.4 mg  PRN Q5MIN  PRN  2/19/18 16:30


 2/20/18 16:29   


 


 


 Ondansetron HCl


  (Zofran)  4 mg  PRN Q4HRS  PRN  2/19/18 16:30


 2/20/18 16:29   


 


 


 Sodium Chloride  2,610 ml @ 


 2,610 mls/hr  Q1H  2/19/18 16:53


   UNV  


 











Allergies


Allergies





Allergies








Coded Allergies Type Severity Reaction Last Updated Verified


 


  No Known Drug Allergies    2/2/18 No











Physical Exam


Physical Exam


Constitutional: Well developed, well nourished, moderate distress.


HENT: Normocephalic, atraumatic, bilateral external ears normal, oropharynx 

moist, nose normal.


Eyes: conjunctiva normal, no discharge.


Neck: supple, no stridor.


Cardiovascular:  tachycardic, regular, no murmurs, no edema. 


Lungs & Thorax:  tight throughout, expiratory wheezing, moderate respiratory 

distress.


Abdomen: soft, nontender, nondistended.


Skin: Warm, dry, no erythema, no rash.


Back: No tenderness.


Extremities: No tenderness, no edema. no calf tenderness or swelling.


Neurologic: Alert and oriented X 3, no focal deficits noted. 


Psychologic: Affect normal, judgement normal, mood normal.





Current Patient Data


Vital Signs





 Vital Signs








  Date Time  Temp Pulse Resp B/P (MAP) Pulse Ox O2 Delivery O2 Flow Rate FiO2


 


2/19/18 15:08 98.4 114 22  88 Room Air  








Lab Results





 Laboratory Tests








Test


  2/19/18


15:30


 


White Blood Count


  8.7 x10^3/uL


(4.0-11.0)


 


Red Blood Count


  4.20 x10^6/uL


(4.30-5.70)  L


 


Hemoglobin


  13.4 g/dL


(13.0-17.5)


 


Hematocrit


  39.7 %


(39.0-53.0)


 


Mean Corpuscular Volume


  95 fL ()


 


 


Mean Corpuscular Hemoglobin 32 pg (25-35)  


 


Mean Corpuscular Hemoglobin


Concent 34 g/dL


(31-37)


 


Red Cell Distribution Width


  13.2 %


(11.5-14.5)


 


Platelet Count


  230 x10^3/uL


(140-400)


 


Neutrophils (%) (Auto) 76 % (31-73)  H


 


Lymphocytes (%) (Auto) 16 % (24-48)  L


 


Monocytes (%) (Auto) 8 % (0-9)  


 


Eosinophils (%) (Auto) 1 % (0-3)  


 


Basophils (%) (Auto) 1 % (0-3)  


 


Neutrophils # (Auto)


  6.6 x10^3uL


(1.8-7.7)


 


Lymphocytes # (Auto)


  1.4 x10^3/uL


(1.0-4.8)


 


Monocytes # (Auto)


  0.7 x10^3/uL


(0.0-1.1)


 


Eosinophils # (Auto)


  0.0 x10^3/uL


(0.0-0.7)


 


Basophils # (Auto)


  0.0 x10^3/uL


(0.0-0.2)


 


Prothrombin Time


  10.6 SEC


(9.4-11.4)


 


Prothrombin Time INR 1.0 (0.9-1.1)  


 


Sodium Level


  138 mmol/L


(136-145)


 


Potassium Level


  4.8 mmol/L


(3.5-5.1)


 


Chloride Level


  98 mmol/L


()


 


Carbon Dioxide Level


  37 mmol/L


(21-32)  H


 


Anion Gap 3 (6-14)  L


 


Blood Urea Nitrogen


  16 mg/dL


(8-26)


 


Creatinine


  1.0 mg/dL


(0.7-1.3)


 


Estimated GFR


(Cockcroft-Gault) 75.2  


 


 


BUN/Creatinine Ratio 16 (6-20)  


 


Glucose Level


  140 mg/dL


(70-99)  H


 


Calcium Level


  8.6 mg/dL


(8.5-10.1)


 


Total Bilirubin


  0.5 mg/dL


(0.2-1.0)


 


Aspartate Amino Transferase


(AST) 18 U/L (15-37)


 


 


Alanine Aminotransferase (ALT)


  29 U/L (16-63)


 


 


Alkaline Phosphatase


  68 U/L


()


 


Troponin I Quantitative


  0.226 ng/mL


(0-0.055)  H


 


NT-Pro-B-Type Natriuretic


Peptide 2621 pg/mL


(0-124)  H


 


Total Protein


  6.9 g/dL


(6.4-8.2)


 


Albumin


  2.9 g/dL


(3.4-5.0)  L


 


Albumin/Globulin Ratio


  0.7 (1.0-1.7)


L











EKG


EKG


Interpreted by me: Normal sinus rhythm rate 82, no acute ST or T wave changes, 

normal intervals, no ectopy, there is some motion artifact present[]





Radiology/Procedures


Radiology/Procedures


PROCEDURE: CHEST AP ONLY





Portable AP upright view CXR:





Clinical indications: Shortness of breath.





Comparison: February 2, 2018.





Findings: Hyperinflation is seen consistent with COPD.  No acute lung


infiltrate or pleural effusion or pulmonary edema or lung mass or pneumothorax


is seen.  The heart size, pulmonary vasculature, mediastinum and both ambrosio are


unremarkable. 





Impression: No acute radiographic abnormality is seen.  COPD.














DICTATED AND SIGNED BY:     JESSA EVANGELISTA MD


DATE:     02/19/18 1545


[]





Course & Med Decision Making


Course & Med Decision Making


Pertinent Labs and Imaging studies reviewed. (See chart for details)


The patient presents with COPD exacerbation. Room air oxygen sat mid to high 80s

, placed on oxygen by nasal cannula. Administered DuoNeb and albuterol 

treatments, Solu-Medrol, aspirin. Tachycardic, obtained lactic acid and blood 

cultures and administered IV fluids per sepsis protocol. Obtained labs, EKG, 

chest x-ray. Troponin elevated without evidence of STEMI on chest x-ray. No 

ongoing chest pain, suspect likely related to underlying illness. I discussed 

with Al[place], cardiology nurse practitioner, recommends okay to keep [facility] for now, they will consult, we will obtain serial troponin and defer 

heparin administration at this time. No evidence of pneumonia on chest x-ray, 

will give antibiotics for bronchitis. Recommend admission to the hospital for 

further evaluation and treatment. The patient agrees with plan of care. 

Discussed with Dr. Chicas who agrees to admit to inpatient status. The patient 

is being admitted in guarded condition.


[]





Dragon Disclaimer


Dragon Disclaimer


This electronic medical record was generated, in whole or in part, using a 

voice recognition dictation system.





Departure


Departure:


Impression:  


 Primary Impression:  


 COPD exacerbation


 Additional Impression:  


 Elevated troponin


Disposition:  09 ADMITTED AS INPATIENT


Admitting Physician:  Aleah Chicas


Condition:  GUARDED


Referrals:  


PCP,NO (PCP)





Problem Qualifiers











VIRIDIANA ROCA MD Feb 19, 2018 17:12

## 2018-02-19 NOTE — RAD
Portable AP upright view CXR:



Clinical indications: Shortness of breath.



Comparison: February 2, 2018.



Findings: Hyperinflation is seen consistent with COPD.  No acute lung

infiltrate or pleural effusion or pulmonary edema or lung mass or pneumothorax

is seen.  The heart size, pulmonary vasculature, mediastinum and both ambrosio are

unremarkable. 



Impression: No acute radiographic abnormality is seen.  COPD.

## 2018-02-20 NOTE — HP
ADMIT DATE:  02/19/2018



REASON FOR ADMISSION:  Shortness of breath, hypoxia, elevated troponin.



HISTORY OF PRESENT ILLNESS:  This is a 64-year-old male who has been

progressively short of breath, which has been intermittent over the last 6

months, but now has become quite troublesome with coughing up sputum, and

requiring oxygen at home.  He continues to smoke.  He was also recently admitted

not too long ago and was transferred to [place] and was [facility], but [facility] declined and did not do it.  He had been

transferred down to [place] after a 2-minute run of ventricular tachycardia,

then AFib with RVR.



PAST MEDICAL HISTORY:  COPD, heavy tobacco use, history of ventricular

tachycardia, history of AFib with RVR.



PAST SURGICAL HISTORY:  Cataract extraction, prostate biopsies, nasal surgery

and back surgery.



SOCIAL HISTORY:  He is , has no children.  Was a heavy smoker, 2-1/2

packs a day, decreased to 1 pack recently and is down to, what he says, less

than 1 pack per day, but has not smoked much in the last week.  No alcohol.  He

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



REVIEW OF SYSTEMS:  As per HPI.  Positive short of breath with exertion. 

Positive intermittent chest pain.  Positive chills, slight fever.



OBJECTIVE:

VITAL SIGNS:  Blood pressure 104/55, pulse 91, temperature 98.5, pulse ox is 96%

on 2 liters.  Height 76 inches, weight 147 pounds.

GENERAL:  Debilitated looking 64-year-old who is slightly short of breath with

talking.  His color is greyish.

HEENT:  His hearing is normal.  Eyes were clear.  Nose patent.  Throat clear.

NECK:  Supple without adenopathy.

LUNGS:  With diffuse wheezes.

CARDIOVASCULAR:  Regular rhythm and rate.

ABDOMEN:  Soft, nontender.

EXTREMITIES:  Without edema.



LABORATORY DATA:  White count is normal, hemoglobin 12.4, hematocrit 36.9.  ABG,

pCO2 of 49, pO2 is 99 with oxygen.  Influenza was negative.  Chemistry profile,

his troponin was 0.226, 0.128, 0.081.



ASSESSMENT:

1.  Acute hypercapnic respiratory failure.

2.  Chronic obstructive pulmonary disease.

3.  Heavy tobacco abuse.

4.  Elevated troponin.  Echo was normal.

5.  Ventricular tachycardia.

6.  Atrial fibrillation with rapid ventricular response in sinus rhythm.

7.  Tobacco use disorder.



PLAN:  Cardiology still feels he needs a cardiac catheterization.  We will try

to improve his lung status and will possibly transfer tomorrow for cath.





______________________________

BRIT SIDDIQUI DO



DR:  NARENDRA/malorie  JOB#:  [id number] / [id number]

DD:  02/20/2018 13:59  DT:  02/20/2018 14:20

## 2018-02-20 NOTE — EKG
[facility] [address], [place] [address]

Test Date:    2018               Test Time:    05:41:43

Pat Name:     SRAVANI BROWN                Department:   

Patient ID:   [id number]           Room:         [facility] 1

Gender:       M                        Technician:   LOBO

:          1953               Requested By: VIRIDIANA ROCA

Order Number: [id number]            Reading MD:   Aryan Zhong

                                 Measurements

Intervals                              Axis          

Rate:         81                       P:            68

LA:           138                      QRS:          84

QRSD:         80                       T:            78

QT:           352                                    

QTc:          409                                    

                           Interpretive Statements

SINUS RHYTHM

R-S TRANSITION ZONE IN V LEADS DISPLACED TO THE LEFT

QRS(T) CONTOUR ABNORMALITY

CONSIDER ANTEROLATERAL MYOCARDIAL DAMAGE

T ABNORMALITY IN ANTEROSEPTAL LEADS

ABNORMAL ECG

RI6.01



Electronically Signed On 2018 12:02:43 CST by Aryan Zhong

## 2018-02-21 NOTE — PDOC3
Discharge Summary


Visit Information


Date of Admission:  Feb 19, 2018


Date of Discharge:  Feb 21, 2018


Final Diagnosis


Problems


Medical Problems:


(1) COPD exacerbation


Status: Acute  





(2) Elevated troponin


Status: Acute  





ent


Problems


Medical Problems:


(1) COPD exacerbation


Status: Acute  





(2) Elevated troponin


Status: Acute  





1.  troponin elevation - mild, likely demand related.  Echo WNL this month.  In 

light of episode of ventricular tachycardia that occurred during last admission 

and recurrent short salvos as well as complaints of chest pain, suggest cardiac 

cath tomorrow.   


2.  Ventricular tachycardia - 9 beats. 


3.  atrial fibrillation with RVR - remains SR


4.  COPD exacerbation - mgmt per PCP


5.  tobaccoism - cessation encouraged


Problems:  





Brief Hospital Course


Allergies





 Allergies








Coded Allergies Type Severity Reaction Last Updated Verified


 


  No Known Drug Allergies    2/2/18 No








Vital Signs





Vital Signs








  Date Time  Temp Pulse Resp B/P (MAP) Pulse Ox O2 Delivery O2 Flow Rate FiO2


 


2/21/18 11:30 97.9       


 


2/21/18 09:40     97 Nasal Cannula 2.0 


 


2/21/18 09:14  90      


 


2/21/18 06:39   18 119/78 (92)    








Lab Results





Laboratory Tests








Test


  2/19/18


15:30 2/19/18


17:33 2/19/18


18:16 2/19/18


19:20


 


White Blood Count


  8.7 x10^3/uL


(4.0-11.0) 


  


  


 


 


Red Blood Count


  4.20 x10^6/uL


(4.30-5.70) 


  


  


 


 


Hemoglobin


  13.4 g/dL


(13.0-17.5) 


  


  


 


 


Hematocrit


  39.7 %


(39.0-53.0) 


  


  


 


 


Mean Corpuscular Volume 95 fL ()    


 


Mean Corpuscular Hemoglobin 32 pg (25-35)    


 


Mean Corpuscular Hemoglobin


Concent 34 g/dL


(31-37) 


  


  


 


 


Red Cell Distribution Width


  13.2 %


(11.5-14.5) 


  


  


 


 


Platelet Count


  230 x10^3/uL


(140-400) 


  


  


 


 


Neutrophils (%) (Auto) 76 % (31-73)    


 


Lymphocytes (%) (Auto) 16 % (24-48)    


 


Monocytes (%) (Auto) 8 % (0-9)    


 


Eosinophils (%) (Auto) 1 % (0-3)    


 


Basophils (%) (Auto) 1 % (0-3)    


 


Neutrophils # (Auto)


  6.6 x10^3uL


(1.8-7.7) 


  


  


 


 


Lymphocytes # (Auto)


  1.4 x10^3/uL


(1.0-4.8) 


  


  


 


 


Monocytes # (Auto)


  0.7 x10^3/uL


(0.0-1.1) 


  


  


 


 


Eosinophils # (Auto)


  0.0 x10^3/uL


(0.0-0.7) 


  


  


 


 


Basophils # (Auto)


  0.0 x10^3/uL


(0.0-0.2) 


  


  


 


 


Prothrombin Time


  10.6 SEC


(9.4-11.4) 


  


  


 


 


Prothromb Time International


Ratio 1.0 (0.9-1.1) 


  


  


  


 


 


Sodium Level


  138 mmol/L


(136-145) 


  


  


 


 


Potassium Level


  4.8 mmol/L


(3.5-5.1) 


  


  


 


 


Chloride Level


  98 mmol/L


() 


  


  


 


 


Carbon Dioxide Level


  37 mmol/L


(21-32) 


  


  


 


 


Anion Gap 3 (6-14)    


 


Blood Urea Nitrogen


  16 mg/dL


(8-26) 


  


  


 


 


Creatinine


  1.0 mg/dL


(0.7-1.3) 


  


  


 


 


Estimated GFR


(Cockcroft-Gault) 75.2 


  


  


  


 


 


BUN/Creatinine Ratio 16 (6-20)    


 


Glucose Level


  140 mg/dL


(70-99) 


  


  


 


 


Calcium Level


  8.6 mg/dL


(8.5-10.1) 


  


  


 


 


Total Bilirubin


  0.5 mg/dL


(0.2-1.0) 


  


  


 


 


Aspartate Amino Transf


(AST/SGOT) 18 U/L (15-37) 


  


  


  


 


 


Alanine Aminotransferase


(ALT/SGPT) 29 U/L (16-63) 


  


  


  


 


 


Alkaline Phosphatase


  68 U/L


() 


  


  


 


 


Troponin I Quantitative


  0.226 ng/mL


(0-0.055) 


  


  


 


 


NT-Pro-B-Type Natriuretic


Peptide 2621 pg/mL


(0-124) 


  


  


 


 


Total Protein


  6.9 g/dL


(6.4-8.2) 


  


  


 


 


Albumin


  2.9 g/dL


(3.4-5.0) 


  


  


 


 


Albumin/Globulin Ratio 0.7 (1.0-1.7)    


 


Lactic Acid Level


  


  1.6 mmol/L


(0.4-2.0) 


  


 


 


Influenza Type A (Rapid)


  


  


  Negative


(NEGATIVE) 


 


 


Influenza Type B (Rapid)


  


  


  Negative


(NEGATIVE) 


 


 


Nasal Screen MRSA (PCR)


  


  


  


  Negative


(Negative)


 


Test


  2/19/18


21:55 2/20/18


03:40 2/20/18


11:00 2/21/18


06:05


 


Troponin I Quantitative


  0.128 ng/mL


(0-0.055) 0.081 ng/mL


(0-0.055) 


  


 


 


White Blood Count


  


  5.5 x10^3/uL


(4.0-11.0) 


  9.3 x10^3/uL


(4.0-11.0)


 


Red Blood Count


  


  3.84 x10^6/uL


(4.30-5.70) 


  3.50 x10^6/uL


(4.30-5.70)


 


Hemoglobin


  


  12.4 g/dL


(13.0-17.5) 


  11.2 g/dL


(13.0-17.5)


 


Hematocrit


  


  36.9 %


(39.0-53.0) 


  33.6 %


(39.0-53.0)


 


Mean Corpuscular Volume  96 fL ()   96 fL () 


 


Mean Corpuscular Hemoglobin  32 pg (25-35)   32 pg (25-35) 


 


Mean Corpuscular Hemoglobin


Concent 


  34 g/dL


(31-37) 


  33 g/dL


(31-37)


 


Red Cell Distribution Width


  


  13.1 %


(11.5-14.5) 


  13.3 %


(11.5-14.5)


 


Platelet Count


  


  186 x10^3/uL


(140-400) 


  142 x10^3/uL


(140-400)


 


Neutrophils (%) (Auto)  93 % (31-73)   77 % (31-73) 


 


Lymphocytes (%) (Auto)  5 % (24-48)   18 % (24-48) 


 


Monocytes (%) (Auto)  2 % (0-9)   5 % (0-9) 


 


Eosinophils (%) (Auto)  0 % (0-3)   0 % (0-3) 


 


Basophils (%) (Auto)  0 % (0-3)   0 % (0-3) 


 


Neutrophils # (Auto)


  


  5.1 x10^3uL


(1.8-7.7) 


  7.1 x10^3uL


(1.8-7.7)


 


Lymphocytes # (Auto)


  


  0.3 x10^3/uL


(1.0-4.8) 


  1.7 x10^3/uL


(1.0-4.8)


 


Monocytes # (Auto)


  


  0.1 x10^3/uL


(0.0-1.1) 


  0.5 x10^3/uL


(0.0-1.1)


 


Eosinophils # (Auto)


  


  0.0 x10^3/uL


(0.0-0.7) 


  0.0 x10^3/uL


(0.0-0.7)


 


Basophils # (Auto)


  


  0.0 x10^3/uL


(0.0-0.2) 


  0.0 x10^3/uL


(0.0-0.2)


 


Sodium Level


  


  140 mmol/L


(136-145) 


  143 mmol/L


(136-145)


 


Potassium Level


  


  4.7 mmol/L


(3.5-5.1) 


  4.6 mmol/L


(3.5-5.1)


 


Chloride Level


  


  102 mmol/L


() 


  105 mmol/L


()


 


Carbon Dioxide Level


  


  35 mmol/L


(21-32) 


  36 mmol/L


(21-32)


 


Anion Gap  3 (6-14)   2 (6-14) 


 


Blood Urea Nitrogen


  


  15 mg/dL


(8-26) 


  12 mg/dL


(8-26)


 


Creatinine


  


  0.9 mg/dL


(0.7-1.3) 


  0.9 mg/dL


(0.7-1.3)


 


Estimated GFR


(Cockcroft-Gault) 


  85.0 


  


  85.0 


 


 


Glucose Level


  


  225 mg/dL


(70-99) 


  93 mg/dL


(70-99)


 


Calcium Level


  


  8.4 mg/dL


(8.5-10.1) 


  8.1 mg/dL


(8.5-10.1)


 


Magnesium Level


  


  2.2 mg/dL


(1.8-2.4) 


  2.1 mg/dL


(1.8-2.4)


 


Blood Gas pH


  


  


  7.41


(7.35-7.46) 


 


 


Blood Gas PCO2


  


  


  49 mmHg


(35-46) 


 


 


Blood Gas PO2


  


  


  99 mmHg


() 


 


 


Blood Gas HCO3


  


  


  31 mmol/L


(21-28) 


 


 


Arterial Bld O2 Saturation


(Calc) 


  


  98 % (92-99) 


  


 


 


FiO2   28 %  


 


BUN/Creatinine Ratio    13 (6-20) 


 


Total Bilirubin


  


  


  


  0.3 mg/dL


(0.2-1.0)


 


Aspartate Amino Transf


(AST/SGOT) 


  


  


  16 U/L (15-37) 


 


 


Alanine Aminotransferase


(ALT/SGPT) 


  


  


  25 U/L (16-63) 


 


 


Alkaline Phosphatase


  


  


  


  53 U/L


()


 


Total Protein


  


  


  


  5.8 g/dL


(6.4-8.2)


 


Albumin


  


  


  


  2.5 g/dL


(3.4-5.0)


 


Albumin/Globulin Ratio    0.8 (1.0-1.7) 








Brief Hospital Course


Mr. Ayala  is a 64 old [sex] who presented with [ ]


HISTORY OF PRESENT ILLNESS:  This is a 64-year-old male who has been


progressively short of breath, which has been intermittent over the last 6


months, but now has become quite troublesome with coughing up sputum, and


requiring oxygen at home.  He continues to smoke.  He was also recently admitted


not too long ago and was transferred to [place] and was supposed to get a


[facility], but they declined and did not do it.  He had been


transferred down to [place] after a 2-minute run of ventricular tachycardia,


then AFib with RVR. HE STILL NEEDS A CATH AS HE HAS MULTIPLE RISK FACTORS.  HIS 

COPD HAS BEEN TREATED WITH STEROIDS AND BREATHING TREATMENTS AND OXYGEN.  HIS 

TROPONIN HAS BEEN ELEVATED.  HE HAS CONSENTED TO BE TRANSFERRED TO [facility] FOR A 

CATH.





Discharge Information


Condition at Discharge:  Stable


Disposition/Orders:  D/C to Another Facility


Dischare Medications





Current Medications


Albuterol/ Ipratropium (Duoneb) 3 ml 1X  ONCE NEB  Last administered on 2/19/ 18at 15:45;  Start 2/19/18 at 15:45;  Stop 2/19/18 at 15:46;  Status DC


Albuterol Sulfate (Ventolin) 5 mg 1X  ONCE NEB  Last administered on 2/19/18at 

16:00;  Start 2/19/18 at 15:45;  Stop 2/19/18 at 15:46;  Status DC


Methylprednisolone Sodium Succinate (SOLU-Medrol 125MG VIAL) 125 mg 1X  ONCE IV

  Last administered on 2/19/18at 16:00;  Start 2/19/18 at 15:45;  Stop 2/19/18 

at 15:46;  Status DC


Sodium Chloride 1,000 ml @  1,000 mls/hr 1X  ONCE IV  Last administered on 2/19/ 18at 15:45;  Start 2/19/18 at 15:45;  Stop 2/19/18 at 16:44;  Status DC


Aspirin (Children'S Aspirin) 324 mg 1X  ONCE PO  Last administered on 2/19/18at 

16:00;  Start 2/19/18 at 15:45;  Stop 2/19/18 at 15:46;  Status DC


Ibuprofen (Motrin) 600 mg 1X  ONCE PO ;  Start 2/19/18 at 15:45;  Stop 2/19/18 

at 15:45;  Status DC


Sodium Chloride 1,000 ml @  1,000 mls/hr 1X  ONCE IV ;  Start 2/19/18 at 16:15;

  Stop 2/19/18 at 16:30;  Status DC


Ondansetron HCl (Zofran) 4 mg 1X  ONCE IV ;  Start 2/19/18 at 16:30;  Stop 2/19/ 18 at 16:30;  Status DC


Ondansetron HCl (Zofran) 4 mg PRN Q4HRS  PRN IV NAUSEA/VOMITING;  Start 2/19/18 

at 16:30;  Stop 2/20/18 at 16:29;  Status DC


Morphine Sulfate (Morphine 2mg Syringe) 2 mg PRN Q2HR  PRN IV PAIN;  Start 2/19/ 18 at 16:30;  Stop 2/19/18 at 18:43;  Status DC


Acetaminophen (Tylenol) 650 mg PRN Q4HRS  PRN PO FEVER;  Start 2/19/18 at 16:30

;  Stop 2/20/18 at 16:29;  Status DC


Nitroglycerin (Nitrostat) 0.4 mg PRN Q5MIN  PRN SL CHEST PAIN;  Start 2/19/18 

at 16:30;  Stop 2/20/18 at 16:29;  Status DC


Albuterol/ Ipratropium (Duoneb) 3 ml RTQID NEB ;  Start 2/19/18 at 20:00;  Stop 

2/19/18 at 20:40;  Status DC


Sodium Chloride 2,610 ml @  2,610 mls/hr Q1H ONCE IV  Last administered on 2/19/ 18at 18:34;  Start 2/19/18 at 17:00;  Stop 2/19/18 at 17:59;  Status DC


Ceftriaxone Sodium 1 gm/ Sodium Chloride 50 ml @  100 mls/hr 1X  ONCE IV ;  

Start 2/19/18 at 17:15;  Stop 2/19/18 at 17:44;  Status UNV


Ceftriaxone Sodium (Rocephin) 1 gm 1X  ONCE IVP  Last administered on 2/19/18at 

17:30;  Start 2/19/18 at 17:30;  Stop 2/19/18 at 17:31;  Status DC


Morphine Sulfate (Morphine 4mg Syringe) 2 mg PRN Q2HR  PRN IV PAIN Last 

administered on 2/20/18at 15:03;  Start 2/19/18 at 18:43;  Stop 2/20/18 at 16:29

;  Status DC


Nicotine (Nicoderm Cq 21mg) 1 patch DAILY TD  Last administered on 2/21/18at 07:

38;  Start 2/19/18 at 21:00


Enoxaparin Sodium (Lovenox) 40 mg Q24H SQ  Last administered on 2/20/18at 20:17

;  Start 2/19/18 at 21:00


Alprazolam (Xanax) 0.25 mg PRN Q4HRS  PRN PO ANXIETY Last administered on 2/21/ 18at 09:12;  Start 2/19/18 at 20:30


Albuterol/ Ipratropium (Duoneb) 3 ml RTQID NEB  Last administered on 2/21/18at 

09:39;  Start 2/19/18 at 21:00


Montelukast Sodium (Singulair) 10 mg HS PO  Last administered on 2/20/18at 20:17

;  Start 2/19/18 at 21:00


Tamsulosin HCl (Flomax) 0.4 mg HS PO  Last administered on 2/20/18at 20:17;  

Start 2/19/18 at 21:00


Oxycodone HCl (OxyCONTIN) 10 mg PRN BID  PRN PO PAIN Last administered on 2/21/ 18at 07:38;  Start 2/19/18 at 21:00


Diltiazem HCl (Cardizem 24hr Cd) 120 mg DAILY PO  Last administered on 2/21/ 18at 09:14;  Start 2/21/18 at 09:00


Levothyroxine Sodium (Synthroid) 25 mcg DAILY06 PO  Last administered on 2/21/ 18at 05:19;  Start 2/21/18 at 06:00


Prednisone (Prednisone) 10 mg DAILY PO  Last administered on 2/21/18at 09:14;  

Start 2/21/18 at 09:00


Methylprednisolone Sodium Succinate (SOLU-Medrol 125MG VIAL) 125 mg 1X  ONCE IV 

;  Start 2/21/18 at 12:30;  Stop 2/21/18 at 12:31





Active Scripts


Active


Reported


Prednisone 10 Mg Tablet 10 Mg PO DAILY


Levothyroxine Sodium 25 Mcg Tablet 1 Tab PO DAILY06


Diltiazem 24HR Cd (Diltiazem Hcl) 120 Mg Cap.er.24h 1 Cap PO DAILY


Tamsulosin Hcl 0.4 Mg Cap.er.24h 1 Cap PO HS


     LAST DOSE GIVEN:


     DATE:


     TIME:


     NEXT DOSE DUE:


     DATE:


     TIME:


Oxycodone Hcl 10 Mg Tablet 1 Tab PO BID PRN


     LAST DOSE GIVEN:


     DATE:


     TIME:


     NEXT DOSE DUE:


     DATE:


     TIME:


NICODERM CQ 21mg (Nicotine) 1 Each Patch.td24 1 Patch TP DAILY


     LAST DOSE GIVEN:


     DATE:


     TIME:


     NEXT DOSE DUE:


     DATE:


     TIME:


Singulair Tablet (Montelukast Sodium) 10 Mg Tablet 10 Mg PO HS


     LAST DOSE GIVEN:


     DATE:


     TIME:


     NEXT DOSE DUE:


     DATE:


     TIME:


Duoneb 0.5-3(2.5) Mg/3 Ml (Albuterol/Ipratropium) 3 Ml Ampul.neb 3 Ml NEB QID


     LAST DOSE GIVEN:


     DATE:


     TIME:


     NEXT DOSE DUE:


     DATE:


     TIME:


Alprazolam 0.25 Mg Tablet 1 Tab PO Q4HRS PRN


     LAST DOSE GIVEN:


     DATE:


     TIME:


     NEXT DOSE DUE:


     DATE:


     TIME:


Albuterol Sulfate Neb Soln (Albuterol Sulfate) 1.25 Mg/3 Ml Vial.neb 1 Vial NEB 

Q4HRS PRN


     LAST DOSE GIVEN:


     DATE:


     TIME:


     NEXT DOSE DUE:


     DATE:


     TIME:





Patient Instructions


Patient Instuctions


TRANSFER TO [facility] FOR BRIT OLIVEIRA DO Feb 21, 2018 12:17

## 2018-02-21 NOTE — PDOC
MASSIMO QUINTERO APRN 2/21/18 1031:


PROGRESS NOTES


Diagnosis


Problem


Problems


Medical Problems:


(1) COPD exacerbation


Status: Acute  





(2) Elevated troponin


Status: Acute  








Assessment


Problems


Medical Problems:


(1) COPD exacerbation


Status: Acute  





(2) Elevated troponin


Status: Acute  





1.  troponin elevation - mild, likely demand related.  Echo WNL this month.  In 

light of episode of ventricular tachycardia that occurred during last admission 

(>2minutes) and recurrent short salvos as well as complaints of chest pain, 

suggest cardiac cath tomorrow.   


2.  Ventricular tachycardia - 9 beats today  Keep k+ and mg+ WNL.  Cath 

tomorrow.  Consider resuming beta blocker and monitor for increased 

bronchospasm.  


3.  atrial fibrillation with RVR - remains SR, no reoccurrence of atrial 

fibrillation. 


4.  COPD exacerbation - mgmt per PCP, will need pulmonary follow up outpatient. 


5.  tobaccoism - cessation encouraged





Problems:  


Subjective


feeling better though he still appears easily out of breath, denies orthopnea 

or PND.  No chest pain. no palpitations.


Objective





Vital Signs








  Date Time  Temp Pulse Resp B/P (MAP) Pulse Ox O2 Delivery O2 Flow Rate FiO2


 


2/21/18 09:40     97 Nasal Cannula 2.0 


 


2/21/18 09:14  90      


 


2/21/18 07:48 97.4       


 


2/21/18 06:39   18 119/78 (92)    














Intake and Output 


 


 2/21/18





 07:00


 


Intake Total 780 ml


 


Output Total 1300 ml


 


Balance -520 ml


 


 


 


Intake Oral 780 ml


 


Output Urine Total 1300 ml


 


# Bowel Movements 1








Abdomen:  Normal bowel sounds, Soft, No tenderness


Heart:  Regular rate, Normal S1, Normal S2


Extremities:  No cyanosis, No edema, Normal pulses


General:  Alert, Oriented X3, Cooperative, mild distress


Lungs:  Other (decreased with occasional expiratory wheeze)


Neuro:  Normal speech, Strength at 5/5 X4 ext


Psych/Mental Status:  Mental status NL, Mood NL


Review of Relevant


I have reviewed the following items valorie (where applicable) has been applied.


Labs





Laboratory Tests








Test


  2/19/18


15:30 2/19/18


17:33 2/19/18


18:16 2/19/18


19:20


 


White Blood Count


  8.7 x10^3/uL


(4.0-11.0) 


  


  


 


 


Red Blood Count


  4.20 x10^6/uL


(4.30-5.70) 


  


  


 


 


Hemoglobin


  13.4 g/dL


(13.0-17.5) 


  


  


 


 


Hematocrit


  39.7 %


(39.0-53.0) 


  


  


 


 


Mean Corpuscular Volume 95 fL ()    


 


Mean Corpuscular Hemoglobin 32 pg (25-35)    


 


Mean Corpuscular Hemoglobin


Concent 34 g/dL


(31-37) 


  


  


 


 


Red Cell Distribution Width


  13.2 %


(11.5-14.5) 


  


  


 


 


Platelet Count


  230 x10^3/uL


(140-400) 


  


  


 


 


Neutrophils (%) (Auto) 76 % (31-73)    


 


Lymphocytes (%) (Auto) 16 % (24-48)    


 


Monocytes (%) (Auto) 8 % (0-9)    


 


Eosinophils (%) (Auto) 1 % (0-3)    


 


Basophils (%) (Auto) 1 % (0-3)    


 


Neutrophils # (Auto)


  6.6 x10^3uL


(1.8-7.7) 


  


  


 


 


Lymphocytes # (Auto)


  1.4 x10^3/uL


(1.0-4.8) 


  


  


 


 


Monocytes # (Auto)


  0.7 x10^3/uL


(0.0-1.1) 


  


  


 


 


Eosinophils # (Auto)


  0.0 x10^3/uL


(0.0-0.7) 


  


  


 


 


Basophils # (Auto)


  0.0 x10^3/uL


(0.0-0.2) 


  


  


 


 


Prothrombin Time


  10.6 SEC


(9.4-11.4) 


  


  


 


 


Prothromb Time International


Ratio 1.0 (0.9-1.1) 


  


  


  


 


 


Sodium Level


  138 mmol/L


(136-145) 


  


  


 


 


Potassium Level


  4.8 mmol/L


(3.5-5.1) 


  


  


 


 


Chloride Level


  98 mmol/L


() 


  


  


 


 


Carbon Dioxide Level


  37 mmol/L


(21-32) 


  


  


 


 


Anion Gap 3 (6-14)    


 


Blood Urea Nitrogen


  16 mg/dL


(8-26) 


  


  


 


 


Creatinine


  1.0 mg/dL


(0.7-1.3) 


  


  


 


 


Estimated GFR


(Cockcroft-Gault) 75.2 


  


  


  


 


 


BUN/Creatinine Ratio 16 (6-20)    


 


Glucose Level


  140 mg/dL


(70-99) 


  


  


 


 


Calcium Level


  8.6 mg/dL


(8.5-10.1) 


  


  


 


 


Total Bilirubin


  0.5 mg/dL


(0.2-1.0) 


  


  


 


 


Aspartate Amino Transf


(AST/SGOT) 18 U/L (15-37) 


  


  


  


 


 


Alanine Aminotransferase


(ALT/SGPT) 29 U/L (16-63) 


  


  


  


 


 


Alkaline Phosphatase


  68 U/L


() 


  


  


 


 


Troponin I Quantitative


  0.226 ng/mL


(0-0.055) 


  


  


 


 


NT-Pro-B-Type Natriuretic


Peptide 2621 pg/mL


(0-124) 


  


  


 


 


Total Protein


  6.9 g/dL


(6.4-8.2) 


  


  


 


 


Albumin


  2.9 g/dL


(3.4-5.0) 


  


  


 


 


Albumin/Globulin Ratio 0.7 (1.0-1.7)    


 


Lactic Acid Level


  


  1.6 mmol/L


(0.4-2.0) 


  


 


 


Influenza Type A (Rapid)


  


  


  Negative


(NEGATIVE) 


 


 


Influenza Type B (Rapid)


  


  


  Negative


(NEGATIVE) 


 


 


Nasal Screen MRSA (PCR)


  


  


  


  Negative


(Negative)


 


Test


  2/19/18


21:55 2/20/18


03:40 2/20/18


11:00 2/21/18


06:05


 


Troponin I Quantitative


  0.128 ng/mL


(0-0.055) 0.081 ng/mL


(0-0.055) 


  


 


 


White Blood Count


  


  5.5 x10^3/uL


(4.0-11.0) 


  9.3 x10^3/uL


(4.0-11.0)


 


Red Blood Count


  


  3.84 x10^6/uL


(4.30-5.70) 


  3.50 x10^6/uL


(4.30-5.70)


 


Hemoglobin


  


  12.4 g/dL


(13.0-17.5) 


  11.2 g/dL


(13.0-17.5)


 


Hematocrit


  


  36.9 %


(39.0-53.0) 


  33.6 %


(39.0-53.0)


 


Mean Corpuscular Volume  96 fL ()   96 fL () 


 


Mean Corpuscular Hemoglobin  32 pg (25-35)   32 pg (25-35) 


 


Mean Corpuscular Hemoglobin


Concent 


  34 g/dL


(31-37) 


  33 g/dL


(31-37)


 


Red Cell Distribution Width


  


  13.1 %


(11.5-14.5) 


  13.3 %


(11.5-14.5)


 


Platelet Count


  


  186 x10^3/uL


(140-400) 


  142 x10^3/uL


(140-400)


 


Neutrophils (%) (Auto)  93 % (31-73)   77 % (31-73) 


 


Lymphocytes (%) (Auto)  5 % (24-48)   18 % (24-48) 


 


Monocytes (%) (Auto)  2 % (0-9)   5 % (0-9) 


 


Eosinophils (%) (Auto)  0 % (0-3)   0 % (0-3) 


 


Basophils (%) (Auto)  0 % (0-3)   0 % (0-3) 


 


Neutrophils # (Auto)


  


  5.1 x10^3uL


(1.8-7.7) 


  7.1 x10^3uL


(1.8-7.7)


 


Lymphocytes # (Auto)


  


  0.3 x10^3/uL


(1.0-4.8) 


  1.7 x10^3/uL


(1.0-4.8)


 


Monocytes # (Auto)


  


  0.1 x10^3/uL


(0.0-1.1) 


  0.5 x10^3/uL


(0.0-1.1)


 


Eosinophils # (Auto)


  


  0.0 x10^3/uL


(0.0-0.7) 


  0.0 x10^3/uL


(0.0-0.7)


 


Basophils # (Auto)


  


  0.0 x10^3/uL


(0.0-0.2) 


  0.0 x10^3/uL


(0.0-0.2)


 


Sodium Level


  


  140 mmol/L


(136-145) 


  143 mmol/L


(136-145)


 


Potassium Level


  


  4.7 mmol/L


(3.5-5.1) 


  4.6 mmol/L


(3.5-5.1)


 


Chloride Level


  


  102 mmol/L


() 


  105 mmol/L


()


 


Carbon Dioxide Level


  


  35 mmol/L


(21-32) 


  36 mmol/L


(21-32)


 


Anion Gap  3 (6-14)   2 (6-14) 


 


Blood Urea Nitrogen


  


  15 mg/dL


(8-26) 


  12 mg/dL


(8-26)


 


Creatinine


  


  0.9 mg/dL


(0.7-1.3) 


  0.9 mg/dL


(0.7-1.3)


 


Estimated GFR


(Cockcroft-Gault) 


  85.0 


  


  85.0 


 


 


Glucose Level


  


  225 mg/dL


(70-99) 


  93 mg/dL


(70-99)


 


Calcium Level


  


  8.4 mg/dL


(8.5-10.1) 


  8.1 mg/dL


(8.5-10.1)


 


Magnesium Level


  


  2.2 mg/dL


(1.8-2.4) 


  2.1 mg/dL


(1.8-2.4)


 


Blood Gas pH


  


  


  7.41


(7.35-7.46) 


 


 


Blood Gas PCO2


  


  


  49 mmHg


(35-46) 


 


 


Blood Gas PO2


  


  


  99 mmHg


() 


 


 


Blood Gas HCO3


  


  


  31 mmol/L


(21-28) 


 


 


Arterial Bld O2 Saturation


(Calc) 


  


  98 % (92-99) 


  


 


 


FiO2   28 %  


 


BUN/Creatinine Ratio    13 (6-20) 


 


Total Bilirubin


  


  


  


  0.3 mg/dL


(0.2-1.0)


 


Aspartate Amino Transf


(AST/SGOT) 


  


  


  16 U/L (15-37) 


 


 


Alanine Aminotransferase


(ALT/SGPT) 


  


  


  25 U/L (16-63) 


 


 


Alkaline Phosphatase


  


  


  


  53 U/L


()


 


Total Protein


  


  


  


  5.8 g/dL


(6.4-8.2)


 


Albumin


  


  


  


  2.5 g/dL


(3.4-5.0)


 


Albumin/Globulin Ratio    0.8 (1.0-1.7) 








Microbiology


2/19/18 Blood Culture - Preliminary, Resulted


          NO GROWTH AFTER 1 DAY


Medications





Current Medications


Albuterol/ Ipratropium (Duoneb) 3 ml 1X  ONCE NEB  Last administered on 2/19/ 18at 15:45;  Start 2/19/18 at 15:45;  Stop 2/19/18 at 15:46;  Status DC


Albuterol Sulfate (Ventolin) 5 mg 1X  ONCE NEB  Last administered on 2/19/18at 

16:00;  Start 2/19/18 at 15:45;  Stop 2/19/18 at 15:46;  Status DC


Methylprednisolone Sodium Succinate (SOLU-Medrol 125MG VIAL) 125 mg 1X  ONCE IV

  Last administered on 2/19/18at 16:00;  Start 2/19/18 at 15:45;  Stop 2/19/18 

at 15:46;  Status DC


Sodium Chloride 1,000 ml @  1,000 mls/hr 1X  ONCE IV  Last administered on 2/19/ 18at 15:45;  Start 2/19/18 at 15:45;  Stop 2/19/18 at 16:44;  Status DC


Aspirin (Children'S Aspirin) 324 mg 1X  ONCE PO  Last administered on 2/19/18at 

16:00;  Start 2/19/18 at 15:45;  Stop 2/19/18 at 15:46;  Status DC


Ibuprofen (Motrin) 600 mg 1X  ONCE PO ;  Start 2/19/18 at 15:45;  Stop 2/19/18 

at 15:45;  Status DC


Sodium Chloride 1,000 ml @  1,000 mls/hr 1X  ONCE IV ;  Start 2/19/18 at 16:15;

  Stop 2/19/18 at 16:30;  Status DC


Ondansetron HCl (Zofran) 4 mg 1X  ONCE IV ;  Start 2/19/18 at 16:30;  Stop 2/19/ 18 at 16:30;  Status DC


Ondansetron HCl (Zofran) 4 mg PRN Q4HRS  PRN IV NAUSEA/VOMITING;  Start 2/19/18 

at 16:30;  Stop 2/20/18 at 16:29;  Status DC


Morphine Sulfate (Morphine 2mg Syringe) 2 mg PRN Q2HR  PRN IV PAIN;  Start 2/19/ 18 at 16:30;  Stop 2/19/18 at 18:43;  Status DC


Acetaminophen (Tylenol) 650 mg PRN Q4HRS  PRN PO FEVER;  Start 2/19/18 at 16:30

;  Stop 2/20/18 at 16:29;  Status DC


Nitroglycerin (Nitrostat) 0.4 mg PRN Q5MIN  PRN SL CHEST PAIN;  Start 2/19/18 

at 16:30;  Stop 2/20/18 at 16:29;  Status DC


Albuterol/ Ipratropium (Duoneb) 3 ml RTQID NEB ;  Start 2/19/18 at 20:00;  Stop 

2/19/18 at 20:40;  Status DC


Sodium Chloride 2,610 ml @  2,610 mls/hr Q1H ONCE IV  Last administered on 2/19/ 18at 18:34;  Start 2/19/18 at 17:00;  Stop 2/19/18 at 17:59;  Status DC


Ceftriaxone Sodium 1 gm/ Sodium Chloride 50 ml @  100 mls/hr 1X  ONCE IV ;  

Start 2/19/18 at 17:15;  Stop 2/19/18 at 17:44;  Status UNV


Ceftriaxone Sodium (Rocephin) 1 gm 1X  ONCE IVP  Last administered on 2/19/18at 

17:30;  Start 2/19/18 at 17:30;  Stop 2/19/18 at 17:31;  Status DC


Morphine Sulfate (Morphine 4mg Syringe) 2 mg PRN Q2HR  PRN IV PAIN Last 

administered on 2/20/18at 15:03;  Start 2/19/18 at 18:43;  Stop 2/20/18 at 16:29

;  Status DC


Nicotine (Nicoderm Cq 21mg) 1 patch DAILY TD  Last administered on 2/21/18at 07:

38;  Start 2/19/18 at 21:00


Enoxaparin Sodium (Lovenox) 40 mg Q24H SQ  Last administered on 2/20/18at 20:17

;  Start 2/19/18 at 21:00


Alprazolam (Xanax) 0.25 mg PRN Q4HRS  PRN PO ANXIETY Last administered on 2/21/ 18at 09:12;  Start 2/19/18 at 20:30


Albuterol/ Ipratropium (Duoneb) 3 ml RTQID NEB  Last administered on 2/21/18at 

09:39;  Start 2/19/18 at 21:00


Montelukast Sodium (Singulair) 10 mg HS PO  Last administered on 2/20/18at 20:17

;  Start 2/19/18 at 21:00


Tamsulosin HCl (Flomax) 0.4 mg HS PO  Last administered on 2/20/18at 20:17;  

Start 2/19/18 at 21:00


Oxycodone HCl (OxyCONTIN) 10 mg PRN BID  PRN PO PAIN Last administered on 2/21/ 18at 07:38;  Start 2/19/18 at 21:00


Diltiazem HCl (Cardizem 24hr Cd) 120 mg DAILY PO  Last administered on 2/21/ 18at 09:14;  Start 2/21/18 at 09:00


Levothyroxine Sodium (Synthroid) 25 mcg DAILY06 PO  Last administered on 2/21/ 18at 05:19;  Start 2/21/18 at 06:00


Prednisone (Prednisone) 10 mg DAILY PO  Last administered on 2/21/18at 09:14;  

Start 2/21/18 at 09:00





Active Scripts


Active


Reported


Prednisone 10 Mg Tablet 10 Mg PO DAILY


Levothyroxine Sodium 25 Mcg Tablet 1 Tab PO DAILY06


Diltiazem 24HR Cd (Diltiazem Hcl) 120 Mg Cap.er.24h 1 Cap PO DAILY


Tamsulosin Hcl 0.4 Mg Cap.er.24h 1 Cap PO HS


     LAST DOSE GIVEN:


     DATE:


     TIME:


     NEXT DOSE DUE:


     DATE:


     TIME:


Oxycodone Hcl 10 Mg Tablet 1 Tab PO BID PRN


     LAST DOSE GIVEN:


     DATE:


     TIME:


     NEXT DOSE DUE:


     DATE:


     TIME:


NICODERM CQ 21mg (Nicotine) 1 Each Patch.td24 1 Patch TP DAILY


     LAST DOSE GIVEN:


     DATE:


     TIME:


     NEXT DOSE DUE:


     DATE:


     TIME:


Singulair Tablet (Montelukast Sodium) 10 Mg Tablet 10 Mg PO HS


     LAST DOSE GIVEN:


     DATE:


     TIME:


     NEXT DOSE DUE:


     DATE:


     TIME:


Duoneb 0.5-3(2.5) Mg/3 Ml (Albuterol/Ipratropium) 3 Ml Ampul.neb 3 Ml NEB QID


     LAST DOSE GIVEN:


     DATE:


     TIME:


     NEXT DOSE DUE:


     DATE:


     TIME:


Alprazolam 0.25 Mg Tablet 1 Tab PO Q4HRS PRN


     LAST DOSE GIVEN:


     DATE:


     TIME:


     NEXT DOSE DUE:


     DATE:


     TIME:


Albuterol Sulfate Neb Soln (Albuterol Sulfate) 1.25 Mg/3 Ml Vial.neb 1 Vial NEB 

Q4HRS PRN


     LAST DOSE GIVEN:


     DATE:


     TIME:


     NEXT DOSE DUE:


     DATE:


     TIME:


Vitals/I & O





Vital Sign - Last 24 Hours








 2/20/18 2/20/18 2/20/18 2/20/18





 11:00 11:18 14:29 15:03


 


Temp  98.5  


 


Pulse   97 


 


Resp   18 


 


B/P (MAP)   118/64 (82) 


 


Pulse Ox   96 


 


O2 Delivery Nasal Cannula  Nasal Cannula Nasal Cannula


 


O2 Flow Rate 3.0  2.0 2.0


 


    





 2/20/18 2/20/18 2/20/18 2/20/18





 15:10 15:33 15:53 16:13


 


Temp   98.5 


 


Pulse Ox 96   


 


O2 Delivery Nasal Cannula Nasal Cannula  Nasal Cannula


 


O2 Flow Rate 2.0 2.0  3.0


 


    





 2/20/18 2/20/18 2/20/18 2/20/18





 18:18 19:20 19:38 20:17


 


Temp   98.5 


 


Pulse 109  107 


 


Resp 18  21 20


 


B/P (MAP) 100/59 (73)  114/66 (82) 


 


Pulse Ox 96  99 


 


O2 Delivery Nasal Cannula Nasal Cannula Nasal Cannula Nasal Cannula


 


O2 Flow Rate 2.0 3.0 2.0 3.0


 


    





 2/20/18 2/20/18 2/20/18 2/20/18





 20:40 20:45 21:05 22:20


 


Pulse 63  112 116


 


Resp 18  20 22


 


B/P (MAP) 115/67 (83)  115/67 (83) 90/46 (61)


 


Pulse Ox 92 97 98 98


 


O2 Delivery Nasal Cannula Nasal Cannula Nasal Cannula Nasal Cannula


 


O2 Flow Rate 2.0 2.0 3.0 3.0





 2/20/18 2/21/18 2/21/18 2/21/18





 23:35 00:13 00:20 02:20


 


Pulse  107  101


 


Resp  19 22 20


 


B/P (MAP)  110/73 (85)  108/69 (82)


 


Pulse Ox  96  98


 


O2 Delivery Nasal Cannula Nasal Cannula Nasal Cannula Nasal Cannula


 


O2 Flow Rate 3.0 3.0 3.0 3.0





 2/21/18 2/21/18 2/21/18 2/21/18





 04:05 04:05 06:39 07:48


 


Temp    97.4


 


Pulse 94  98 


 


Resp 20  18 


 


B/P (MAP) 108/74 (85)  119/78 (92) 


 


Pulse Ox 99  100 


 


O2 Delivery Nasal Cannula Nasal Cannula Nasal Cannula 


 


O2 Flow Rate 3.0 3.0 3.0 


 


    





 2/21/18 2/21/18 2/21/18 





 08:00 09:14 09:40 


 


Pulse  90  


 


Pulse Ox   97 


 


O2 Delivery Nasal Cannula  Nasal Cannula 


 


O2 Flow Rate 3.0  2.0 














Intake and Output   


 


 2/20/18 2/20/18 2/21/18





 15:00 23:00 07:00


 


Intake Total  300 ml 480 ml


 


Output Total  600 ml 700 ml


 


Balance  -300 ml -220 ml











ABBI GALLEGOS MD 2/21/18 1614:


PROGRESS NOTES


Review of Relevant


Pt. seen and examined. Agree with above NP note. 


64 y.o with multiple risk factors presenting with recurrent chest pain and 

elevated troponin


Plan for cardiac cath tomorrow. Discussed r/b/a with patient and he is 

agreeable. 


Thanks. Will f/u after cath tomorrow.











MASSIMO QUINTERO APRN Feb 21, 2018 10:31


ABBI GALLEGOS MD Feb 21, 2018 16:14

## 2018-02-25 NOTE — PHYS DOC
General


Chief Complaint:  POST-OP PROBLEM


Stated Complaint:  POST ANGIOPLASTY PROBLEMS WITH PLUG AND SWELLING


Time Seen by MD:  23:17


Source:  patient, old records


Exam Limitations:  no limitations


Problems:  





History of Present Illness


Initial Comments


64-year-old male who comes to the ED complaining of possible complications from 

angioplasty.


Patient was discharged from [facility] yesterday after extensive 

cardiac workup including angioplasty for history of arrhythmia. Patient states 

that today he has noticed some bruising extending down his leg from the 

catheter site in his right groin, denies any new swelling or discomfort. He 

also says that his feet looked more swollen to him.


Denies any new cough fever chills chest pain or dyspnea he has COPD history 

with chronic respiratory failure uses home O2.


Timing/Duration:  other


Severity:  mild


Modifying Factors:  improves with other


Associated Symptoms:  other


Allergies:  


Coded Allergies:  


     No Known Drug Allergies (Unverified , 2/2/18)





Past Medical History


Medical History:  other (COPD, arrhythmia, chronic respiratory failure he uses 

home O2, tobaccoism)


Surgical History:  other (cataracts, prostate biopsy, nose and back surgeries)





Social History


Smoker:  greater than 1 pack/day


Alcohol:  none


Drugs:  none





Review of Systems


Constitutional:  denies chills, denies fever, denies malaise


EENTM:  denies ear pain, denies nose pain, denies throat pain


Respiratory:  see HPI, cough, shortness of breath, wheezing


Cardiovascular:  see HPI, denies palpitations, denies syncope


Gastrointestinal:  denies abdominal pain, denies nausea, denies vomiting


Psychiatric/Neurological:  anxiety, denies headache, denies paresthesia


Hematologic/Lymphatic:  denies anemia, denies blood clots





Physical Exam


General Appearance:  mild distress (anxious, hyperventilating)


Ear, Nose, Throat:  hearing grossly normal, normal ENT inspection, normal 

pharynx


Neck:  non-tender, supple


Respiratory:  chest non-tender, no respiratory distress, decreased breath sounds

, wheezing


Cardiovascular:  normal peripheral pulses, regular rate, rhythm, no edema


Extremities:  non-tender, normal inspection, no calf tenderness


Neurologic/Psychiatric:  CNs II-XII nml as tested, no motor/sensory deficits, 

normal mood/affect (very anxious initially however relaxes over time), oriented 

x 3





Orders, Labs, Meds


EKG:  NSR 95 bpm no ST segment elevation interpreted by me.





Chest AP: Increased perihilar markings bilaterally with hyperinflation, 

questionable right lower lobe infiltrate versus technique interpreted by me





Labs unremarkable





I discussed normal bruising at catheter site and reassured the patient. Due to 

questionable infiltrate and the patient's comorbidities we will go ahead and 

treat with doxycycline. I discussed signs and symptoms to monitor as well as 

indications for urgent return to the department patient expressed agreement and 

understanding with the treatment plan.


Departure


Time of Disposition:  00:50


Disposition:  01 HOME, SELF-CARE


Diagnosis:  anxiety, RLL Infiltrate


Condition:  STABLE


Patient Instructions:  Chronic Obstructive Pulmonary Disease, Easy-to-Read





Additional Instructions:  


Continue current medications


Rx:  doxycycline


Follow up with a doctor in 2-3 days for recheck.


Return to ED with new or changing symptoms.











JERI AGARWAL DO Feb 25, 2018 23:32

## 2018-02-26 NOTE — RAD
Portable chest, 2/25/2018:



History: Shortness of breath



Comparison is made to a study from 2/19/2018.  The heart size is normal.  The

lungs are hyperexpanded due to known emphysema.  There are scattered

parenchymal scars.  Mild infiltrate is now evident medially in the right base.

 The left lung is clear.  There is no evidence of pleural fluid.



IMPRESSION:

1.  Emphysema with parenchymal scarring.

2.  Mild right basilar infiltrate compatible with pneumonia.







Note: The findings were called to personnel in the [facility] ER at 8:15 AM on

2/26/2018.

## 2018-02-26 NOTE — EKG
[facility] [address], [place] [address]

Test Date:    2018               Test Time:    23:17:44

Pat Name:     SRAVANI BROWN                Department:   

Patient ID:   [id number]           Room:          

Gender:       M                        Technician:   PORSHA

:          [id number]               Requested By: JERI AGARWAL

Order Number: [id number]            Reading MD:   Abdi Osorio MD

                                 Measurements

Intervals                              Axis          

Rate:         95                       P:            67

RI:           130                      QRS:          73

QRSD:         80                       T:            70

QT:           316                                    

QTc:          400                                    

                           Interpretive Statements

SINUS RHYTHM



Electronically Signed On 3-5-2018 14:02:30 CST by Abdi Osorio MD